# Patient Record
Sex: MALE | Race: WHITE
[De-identification: names, ages, dates, MRNs, and addresses within clinical notes are randomized per-mention and may not be internally consistent; named-entity substitution may affect disease eponyms.]

---

## 2018-04-30 ENCOUNTER — HOSPITAL ENCOUNTER (INPATIENT)
Dept: HOSPITAL 84 - D.ER | Age: 78
LOS: 18 days | Discharge: NURSING, REHAB OR LONG-TERM CARE | DRG: 291 | End: 2018-05-18
Attending: FAMILY MEDICINE | Admitting: FAMILY MEDICINE
Payer: MEDICARE

## 2018-04-30 VITALS
WEIGHT: 271.82 LBS | BODY MASS INDEX: 43.69 KG/M2 | BODY MASS INDEX: 43.69 KG/M2 | HEIGHT: 66 IN | BODY MASS INDEX: 43.69 KG/M2 | HEIGHT: 66 IN | BODY MASS INDEX: 43.69 KG/M2 | WEIGHT: 271.82 LBS

## 2018-04-30 VITALS — DIASTOLIC BLOOD PRESSURE: 79 MMHG | SYSTOLIC BLOOD PRESSURE: 160 MMHG

## 2018-04-30 DIAGNOSIS — I13.0: ICD-10-CM

## 2018-04-30 DIAGNOSIS — I25.10: ICD-10-CM

## 2018-04-30 DIAGNOSIS — E11.65: ICD-10-CM

## 2018-04-30 DIAGNOSIS — J44.0: ICD-10-CM

## 2018-04-30 DIAGNOSIS — N40.0: ICD-10-CM

## 2018-04-30 DIAGNOSIS — J69.0: ICD-10-CM

## 2018-04-30 DIAGNOSIS — E11.40: ICD-10-CM

## 2018-04-30 DIAGNOSIS — N18.6: ICD-10-CM

## 2018-04-30 DIAGNOSIS — F32.9: ICD-10-CM

## 2018-04-30 DIAGNOSIS — Z87.891: ICD-10-CM

## 2018-04-30 DIAGNOSIS — J18.9: ICD-10-CM

## 2018-04-30 DIAGNOSIS — I50.23: ICD-10-CM

## 2018-04-30 DIAGNOSIS — I95.9: ICD-10-CM

## 2018-04-30 DIAGNOSIS — J93.0: ICD-10-CM

## 2018-04-30 DIAGNOSIS — F41.9: ICD-10-CM

## 2018-04-30 DIAGNOSIS — E78.5: ICD-10-CM

## 2018-04-30 DIAGNOSIS — N18.9: ICD-10-CM

## 2018-04-30 DIAGNOSIS — J44.1: ICD-10-CM

## 2018-04-30 DIAGNOSIS — J96.21: ICD-10-CM

## 2018-04-30 DIAGNOSIS — J96.22: ICD-10-CM

## 2018-04-30 DIAGNOSIS — N17.9: ICD-10-CM

## 2018-04-30 DIAGNOSIS — J98.11: ICD-10-CM

## 2018-04-30 DIAGNOSIS — J81.1: ICD-10-CM

## 2018-04-30 DIAGNOSIS — I11.0: Primary | ICD-10-CM

## 2018-04-30 LAB
ALBUMIN SERPL-MCNC: 3.4 G/DL (ref 3.4–5)
ALP SERPL-CCNC: 73 U/L (ref 46–116)
ALT SERPL-CCNC: 19 U/L (ref 10–68)
ANION GAP SERPL CALC-SCNC: 12 MMOL/L (ref 8–16)
APTT BLD: 28.5 SECONDS (ref 22.8–39.4)
BASOPHILS NFR BLD AUTO: 0.2 % (ref 0–2)
BILIRUB SERPL-MCNC: 0.3 MG/DL (ref 0.2–1.3)
BUN SERPL-MCNC: 28 MG/DL (ref 7–18)
CALCIUM SERPL-MCNC: 8.9 MG/DL (ref 8.5–10.1)
CHLORIDE SERPL-SCNC: 103 MMOL/L (ref 98–107)
CK MB SERPL-MCNC: 1.2 U/L (ref 0–3.6)
CK SERPL-CCNC: 30 UL (ref 21–232)
CO2 SERPL-SCNC: 33.8 MMOL/L (ref 21–32)
CREAT SERPL-MCNC: 1.4 MG/DL (ref 0.6–1.3)
D DIMER PPP FEU-MCNC: 0.92 UG/MLFEU (ref 0.2–0.54)
EOSINOPHIL NFR BLD: 4 % (ref 0–7)
ERYTHROCYTE [DISTWIDTH] IN BLOOD BY AUTOMATED COUNT: 13 % (ref 11.5–14.5)
GLOBULIN SER-MCNC: 2.9 G/L
GLUCOSE SERPL-MCNC: 149 MG/DL (ref 74–106)
HCT VFR BLD CALC: 45.1 % (ref 42–54)
HGB BLD-MCNC: 14.4 G/DL (ref 13.5–17.5)
IMM GRANULOCYTES NFR BLD: 0.2 % (ref 0–5)
INR PPP: 1.02 (ref 0.85–1.17)
LYMPHOCYTES NFR BLD AUTO: 31.1 % (ref 15–50)
MCH RBC QN AUTO: 32.6 PG (ref 26–34)
MCHC RBC AUTO-ENTMCNC: 31.9 G/DL (ref 31–37)
MCV RBC: 102 FL (ref 80–100)
MONOCYTES NFR BLD: 6.9 % (ref 2–11)
NEUTROPHILS NFR BLD AUTO: 57.6 % (ref 40–80)
NT-PROBNP SERPL-MCNC: 517 PG/ML (ref 0–450)
OSMOLALITY SERPL CALC.SUM OF ELEC: 297 MOSM/KG (ref 275–300)
PLATELET # BLD: 182 10X3/UL (ref 130–400)
PMV BLD AUTO: 11.1 FL (ref 7.4–10.4)
POTASSIUM SERPL-SCNC: 3.8 MMOL/L (ref 3.5–5.1)
PROT SERPL-MCNC: 6.3 G/DL (ref 6.4–8.2)
PROTHROMBIN TIME: 13 SECONDS (ref 11.6–15)
RBC # BLD AUTO: 4.42 10X6/UL (ref 4.2–6.1)
SODIUM SERPL-SCNC: 145 MMOL/L (ref 136–145)
TROPONIN I SERPL-MCNC: < 0.017 NG/ML (ref 0–0.06)
WBC # BLD AUTO: 8.3 10X3/UL (ref 4.8–10.8)

## 2018-05-01 VITALS — SYSTOLIC BLOOD PRESSURE: 152 MMHG | DIASTOLIC BLOOD PRESSURE: 84 MMHG

## 2018-05-01 VITALS — DIASTOLIC BLOOD PRESSURE: 80 MMHG | SYSTOLIC BLOOD PRESSURE: 170 MMHG

## 2018-05-01 VITALS — DIASTOLIC BLOOD PRESSURE: 80 MMHG | SYSTOLIC BLOOD PRESSURE: 163 MMHG

## 2018-05-01 VITALS — SYSTOLIC BLOOD PRESSURE: 165 MMHG | DIASTOLIC BLOOD PRESSURE: 85 MMHG

## 2018-05-01 VITALS — DIASTOLIC BLOOD PRESSURE: 73 MMHG | SYSTOLIC BLOOD PRESSURE: 154 MMHG

## 2018-05-01 VITALS — SYSTOLIC BLOOD PRESSURE: 100 MMHG | DIASTOLIC BLOOD PRESSURE: 73 MMHG

## 2018-05-01 VITALS — DIASTOLIC BLOOD PRESSURE: 81 MMHG | SYSTOLIC BLOOD PRESSURE: 145 MMHG

## 2018-05-01 LAB
ALBUMIN SERPL-MCNC: 3.3 G/DL (ref 3.4–5)
ALP SERPL-CCNC: 61 U/L (ref 46–116)
ALT SERPL-CCNC: 19 U/L (ref 10–68)
ANION GAP SERPL CALC-SCNC: 13.9 MMOL/L (ref 8–16)
BASOPHILS NFR BLD AUTO: 0 % (ref 0–2)
BILIRUB SERPL-MCNC: 0.32 MG/DL (ref 0.2–1.3)
BUN SERPL-MCNC: 28 MG/DL (ref 7–18)
CALCIUM SERPL-MCNC: 9.2 MG/DL (ref 8.5–10.1)
CHLORIDE SERPL-SCNC: 100 MMOL/L (ref 98–107)
CO2 SERPL-SCNC: 31.1 MMOL/L (ref 21–32)
CREAT SERPL-MCNC: 1.3 MG/DL (ref 0.6–1.3)
EOSINOPHIL NFR BLD: 0 % (ref 0–7)
ERYTHROCYTE [DISTWIDTH] IN BLOOD BY AUTOMATED COUNT: 12.7 % (ref 11.5–14.5)
GLOBULIN SER-MCNC: 3.5 G/L
GLUCOSE SERPL-MCNC: 235 MG/DL (ref 74–106)
HCT VFR BLD CALC: 45.8 % (ref 42–54)
HGB BLD-MCNC: 14.8 G/DL (ref 13.5–17.5)
IMM GRANULOCYTES NFR BLD: 0.2 % (ref 0–5)
LYMPHOCYTES NFR BLD AUTO: 6.8 % (ref 15–50)
MCH RBC QN AUTO: 32.5 PG (ref 26–34)
MCHC RBC AUTO-ENTMCNC: 32.3 G/DL (ref 31–37)
MCV RBC: 100.4 FL (ref 80–100)
MONOCYTES NFR BLD: 2.3 % (ref 2–11)
NEUTROPHILS NFR BLD AUTO: 90.7 % (ref 40–80)
OSMOLALITY SERPL CALC.SUM OF ELEC: 294 MOSM/KG (ref 275–300)
PLATELET # BLD: 216 10X3/UL (ref 130–400)
PMV BLD AUTO: 11.1 FL (ref 7.4–10.4)
POTASSIUM SERPL-SCNC: 4 MMOL/L (ref 3.5–5.1)
PROT SERPL-MCNC: 6.8 G/DL (ref 6.4–8.2)
RBC # BLD AUTO: 4.56 10X6/UL (ref 4.2–6.1)
SODIUM SERPL-SCNC: 141 MMOL/L (ref 136–145)
WBC # BLD AUTO: 8.3 10X3/UL (ref 4.8–10.8)

## 2018-05-02 VITALS — DIASTOLIC BLOOD PRESSURE: 71 MMHG | SYSTOLIC BLOOD PRESSURE: 161 MMHG

## 2018-05-02 VITALS — DIASTOLIC BLOOD PRESSURE: 82 MMHG | SYSTOLIC BLOOD PRESSURE: 159 MMHG

## 2018-05-02 VITALS — SYSTOLIC BLOOD PRESSURE: 138 MMHG | DIASTOLIC BLOOD PRESSURE: 73 MMHG

## 2018-05-02 VITALS — SYSTOLIC BLOOD PRESSURE: 136 MMHG | DIASTOLIC BLOOD PRESSURE: 63 MMHG

## 2018-05-02 VITALS — SYSTOLIC BLOOD PRESSURE: 166 MMHG | DIASTOLIC BLOOD PRESSURE: 88 MMHG

## 2018-05-02 LAB
ALBUMIN SERPL-MCNC: 3.2 G/DL (ref 3.4–5)
ALP SERPL-CCNC: 64 U/L (ref 46–116)
ALT SERPL-CCNC: 18 U/L (ref 10–68)
ANION GAP SERPL CALC-SCNC: 9.3 MMOL/L (ref 8–16)
BASOPHILS NFR BLD AUTO: 0 % (ref 0–2)
BILIRUB SERPL-MCNC: 0.3 MG/DL (ref 0.2–1.3)
BUN SERPL-MCNC: 27 MG/DL (ref 7–18)
CALCIUM SERPL-MCNC: 9.1 MG/DL (ref 8.5–10.1)
CHLORIDE SERPL-SCNC: 104 MMOL/L (ref 98–107)
CO2 SERPL-SCNC: 33.6 MMOL/L (ref 21–32)
CREAT SERPL-MCNC: 1.1 MG/DL (ref 0.6–1.3)
EOSINOPHIL NFR BLD: 0 % (ref 0–7)
ERYTHROCYTE [DISTWIDTH] IN BLOOD BY AUTOMATED COUNT: 12.7 % (ref 11.5–14.5)
GLOBULIN SER-MCNC: 3.2 G/L
GLUCOSE SERPL-MCNC: 210 MG/DL (ref 74–106)
HCT VFR BLD CALC: 45.4 % (ref 42–54)
HGB BLD-MCNC: 14.7 G/DL (ref 13.5–17.5)
IMM GRANULOCYTES NFR BLD: 0.2 % (ref 0–5)
LYMPHOCYTES NFR BLD AUTO: 7.7 % (ref 15–50)
MCH RBC QN AUTO: 32.2 PG (ref 26–34)
MCHC RBC AUTO-ENTMCNC: 32.4 G/DL (ref 31–37)
MCV RBC: 99.3 FL (ref 80–100)
MONOCYTES NFR BLD: 4.2 % (ref 2–11)
NEUTROPHILS NFR BLD AUTO: 87.9 % (ref 40–80)
OSMOLALITY SERPL CALC.SUM OF ELEC: 295 MOSM/KG (ref 275–300)
PLATELET # BLD: 229 10X3/UL (ref 130–400)
PMV BLD AUTO: 11.5 FL (ref 7.4–10.4)
POTASSIUM SERPL-SCNC: 3.9 MMOL/L (ref 3.5–5.1)
PROT SERPL-MCNC: 6.4 G/DL (ref 6.4–8.2)
RBC # BLD AUTO: 4.57 10X6/UL (ref 4.2–6.1)
SODIUM SERPL-SCNC: 143 MMOL/L (ref 136–145)
WBC # BLD AUTO: 9.8 10X3/UL (ref 4.8–10.8)

## 2018-05-03 VITALS — SYSTOLIC BLOOD PRESSURE: 130 MMHG | DIASTOLIC BLOOD PRESSURE: 70 MMHG

## 2018-05-03 VITALS — DIASTOLIC BLOOD PRESSURE: 83 MMHG | SYSTOLIC BLOOD PRESSURE: 179 MMHG

## 2018-05-03 VITALS — DIASTOLIC BLOOD PRESSURE: 68 MMHG | SYSTOLIC BLOOD PRESSURE: 132 MMHG

## 2018-05-03 VITALS — SYSTOLIC BLOOD PRESSURE: 144 MMHG | DIASTOLIC BLOOD PRESSURE: 75 MMHG

## 2018-05-03 VITALS — DIASTOLIC BLOOD PRESSURE: 71 MMHG | SYSTOLIC BLOOD PRESSURE: 126 MMHG

## 2018-05-03 LAB
ALBUMIN SERPL-MCNC: 3 G/DL (ref 3.4–5)
ALP SERPL-CCNC: 59 U/L (ref 46–116)
ALT SERPL-CCNC: 20 U/L (ref 10–68)
ANION GAP SERPL CALC-SCNC: 6.9 MMOL/L (ref 8–16)
BASOPHILS NFR BLD AUTO: 0 % (ref 0–2)
BILIRUB SERPL-MCNC: 0.2 MG/DL (ref 0.2–1.3)
BUN SERPL-MCNC: 36 MG/DL (ref 7–18)
CALCIUM SERPL-MCNC: 9.1 MG/DL (ref 8.5–10.1)
CHLORIDE SERPL-SCNC: 104 MMOL/L (ref 98–107)
CO2 SERPL-SCNC: 36.3 MMOL/L (ref 21–32)
CREAT SERPL-MCNC: 1.2 MG/DL (ref 0.6–1.3)
EOSINOPHIL NFR BLD: 0 % (ref 0–7)
ERYTHROCYTE [DISTWIDTH] IN BLOOD BY AUTOMATED COUNT: 12.9 % (ref 11.5–14.5)
GLOBULIN SER-MCNC: 3.1 G/L
GLUCOSE SERPL-MCNC: 223 MG/DL (ref 74–106)
HCT VFR BLD CALC: 45 % (ref 42–54)
HGB BLD-MCNC: 14.5 G/DL (ref 13.5–17.5)
IMM GRANULOCYTES NFR BLD: 0.2 % (ref 0–5)
LYMPHOCYTES NFR BLD AUTO: 7.1 % (ref 15–50)
MCH RBC QN AUTO: 32.2 PG (ref 26–34)
MCHC RBC AUTO-ENTMCNC: 32.2 G/DL (ref 31–37)
MCV RBC: 100 FL (ref 80–100)
MONOCYTES NFR BLD: 5.3 % (ref 2–11)
NEUTROPHILS NFR BLD AUTO: 87.4 % (ref 40–80)
OSMOLALITY SERPL CALC.SUM OF ELEC: 299 MOSM/KG (ref 275–300)
PLATELET # BLD: 210 10X3/UL (ref 130–400)
PMV BLD AUTO: 11.4 FL (ref 7.4–10.4)
POTASSIUM SERPL-SCNC: 4.2 MMOL/L (ref 3.5–5.1)
PROT SERPL-MCNC: 6.1 G/DL (ref 6.4–8.2)
RBC # BLD AUTO: 4.5 10X6/UL (ref 4.2–6.1)
SODIUM SERPL-SCNC: 143 MMOL/L (ref 136–145)
WBC # BLD AUTO: 9.6 10X3/UL (ref 4.8–10.8)

## 2018-05-04 VITALS — SYSTOLIC BLOOD PRESSURE: 119 MMHG | DIASTOLIC BLOOD PRESSURE: 76 MMHG

## 2018-05-04 VITALS — SYSTOLIC BLOOD PRESSURE: 117 MMHG | DIASTOLIC BLOOD PRESSURE: 56 MMHG

## 2018-05-04 VITALS — DIASTOLIC BLOOD PRESSURE: 70 MMHG | SYSTOLIC BLOOD PRESSURE: 135 MMHG

## 2018-05-04 VITALS — DIASTOLIC BLOOD PRESSURE: 61 MMHG | SYSTOLIC BLOOD PRESSURE: 125 MMHG

## 2018-05-04 VITALS — SYSTOLIC BLOOD PRESSURE: 130 MMHG | DIASTOLIC BLOOD PRESSURE: 70 MMHG

## 2018-05-04 VITALS — DIASTOLIC BLOOD PRESSURE: 66 MMHG | SYSTOLIC BLOOD PRESSURE: 111 MMHG

## 2018-05-04 LAB
ALBUMIN SERPL-MCNC: 2.7 G/DL (ref 3.4–5)
ALP SERPL-CCNC: 56 U/L (ref 46–116)
ALT SERPL-CCNC: 22 U/L (ref 10–68)
ANION GAP SERPL CALC-SCNC: 8.3 MMOL/L (ref 8–16)
BASOPHILS NFR BLD AUTO: 0 % (ref 0–2)
BILIRUB SERPL-MCNC: 0.28 MG/DL (ref 0.2–1.3)
BUN SERPL-MCNC: 40 MG/DL (ref 7–18)
CALCIUM SERPL-MCNC: 8.8 MG/DL (ref 8.5–10.1)
CHLORIDE SERPL-SCNC: 103 MMOL/L (ref 98–107)
CO2 SERPL-SCNC: 34.9 MMOL/L (ref 21–32)
CREAT SERPL-MCNC: 1.3 MG/DL (ref 0.6–1.3)
EOSINOPHIL NFR BLD: 0 % (ref 0–7)
ERYTHROCYTE [DISTWIDTH] IN BLOOD BY AUTOMATED COUNT: 12.9 % (ref 11.5–14.5)
GLOBULIN SER-MCNC: 3 G/L
GLUCOSE SERPL-MCNC: 210 MG/DL (ref 74–106)
HCT VFR BLD CALC: 45.3 % (ref 42–54)
HGB BLD-MCNC: 14.5 G/DL (ref 13.5–17.5)
IMM GRANULOCYTES NFR BLD: 0.2 % (ref 0–5)
LYMPHOCYTES NFR BLD AUTO: 10.6 % (ref 15–50)
MCH RBC QN AUTO: 32.4 PG (ref 26–34)
MCHC RBC AUTO-ENTMCNC: 32 G/DL (ref 31–37)
MCV RBC: 101.1 FL (ref 80–100)
MONOCYTES NFR BLD: 5.3 % (ref 2–11)
NEUTROPHILS NFR BLD AUTO: 83.9 % (ref 40–80)
OSMOLALITY SERPL CALC.SUM OF ELEC: 298 MOSM/KG (ref 275–300)
PLATELET # BLD: 205 10X3/UL (ref 130–400)
PMV BLD AUTO: 11.5 FL (ref 7.4–10.4)
POTASSIUM SERPL-SCNC: 4.2 MMOL/L (ref 3.5–5.1)
PROT SERPL-MCNC: 5.7 G/DL (ref 6.4–8.2)
RBC # BLD AUTO: 4.48 10X6/UL (ref 4.2–6.1)
SODIUM SERPL-SCNC: 142 MMOL/L (ref 136–145)
WBC # BLD AUTO: 9.6 10X3/UL (ref 4.8–10.8)

## 2018-05-05 VITALS — SYSTOLIC BLOOD PRESSURE: 142 MMHG | DIASTOLIC BLOOD PRESSURE: 78 MMHG

## 2018-05-05 VITALS — DIASTOLIC BLOOD PRESSURE: 81 MMHG | SYSTOLIC BLOOD PRESSURE: 120 MMHG

## 2018-05-05 VITALS — DIASTOLIC BLOOD PRESSURE: 76 MMHG | SYSTOLIC BLOOD PRESSURE: 138 MMHG

## 2018-05-05 VITALS — SYSTOLIC BLOOD PRESSURE: 136 MMHG | DIASTOLIC BLOOD PRESSURE: 76 MMHG

## 2018-05-05 VITALS — SYSTOLIC BLOOD PRESSURE: 145 MMHG | DIASTOLIC BLOOD PRESSURE: 65 MMHG

## 2018-05-05 LAB
ALBUMIN SERPL-MCNC: 2.8 G/DL (ref 3.4–5)
ALP SERPL-CCNC: 64 U/L (ref 46–116)
ALT SERPL-CCNC: 24 U/L (ref 10–68)
ANION GAP SERPL CALC-SCNC: 7.1 MMOL/L (ref 8–16)
BASOPHILS NFR BLD AUTO: 0 % (ref 0–2)
BILIRUB SERPL-MCNC: 0.3 MG/DL (ref 0.2–1.3)
BUN SERPL-MCNC: 49 MG/DL (ref 7–18)
CALCIUM SERPL-MCNC: 8.5 MG/DL (ref 8.5–10.1)
CHLORIDE SERPL-SCNC: 103 MMOL/L (ref 98–107)
CO2 SERPL-SCNC: 36.1 MMOL/L (ref 21–32)
CREAT SERPL-MCNC: 1.4 MG/DL (ref 0.6–1.3)
EOSINOPHIL NFR BLD: 0 % (ref 0–7)
ERYTHROCYTE [DISTWIDTH] IN BLOOD BY AUTOMATED COUNT: 12.7 % (ref 11.5–14.5)
GLOBULIN SER-MCNC: 3 G/L
GLUCOSE SERPL-MCNC: 222 MG/DL (ref 74–106)
HCT VFR BLD CALC: 45.9 % (ref 42–54)
HGB BLD-MCNC: 14.9 G/DL (ref 13.5–17.5)
IMM GRANULOCYTES NFR BLD: 0.3 % (ref 0–5)
LYMPHOCYTES NFR BLD AUTO: 11.6 % (ref 15–50)
MCH RBC QN AUTO: 32.5 PG (ref 26–34)
MCHC RBC AUTO-ENTMCNC: 32.5 G/DL (ref 31–37)
MCV RBC: 100.2 FL (ref 80–100)
MONOCYTES NFR BLD: 7 % (ref 2–11)
NEUTROPHILS NFR BLD AUTO: 81.1 % (ref 40–80)
OSMOLALITY SERPL CALC.SUM OF ELEC: 302 MOSM/KG (ref 275–300)
PLATELET # BLD: 203 10X3/UL (ref 130–400)
PMV BLD AUTO: 11.2 FL (ref 7.4–10.4)
POTASSIUM SERPL-SCNC: 4.2 MMOL/L (ref 3.5–5.1)
PROT SERPL-MCNC: 5.8 G/DL (ref 6.4–8.2)
RBC # BLD AUTO: 4.58 10X6/UL (ref 4.2–6.1)
SODIUM SERPL-SCNC: 142 MMOL/L (ref 136–145)
WBC # BLD AUTO: 9.7 10X3/UL (ref 4.8–10.8)

## 2018-05-06 VITALS — DIASTOLIC BLOOD PRESSURE: 72 MMHG | SYSTOLIC BLOOD PRESSURE: 146 MMHG

## 2018-05-06 VITALS — SYSTOLIC BLOOD PRESSURE: 137 MMHG | DIASTOLIC BLOOD PRESSURE: 80 MMHG

## 2018-05-06 VITALS — SYSTOLIC BLOOD PRESSURE: 126 MMHG | DIASTOLIC BLOOD PRESSURE: 75 MMHG

## 2018-05-06 VITALS — SYSTOLIC BLOOD PRESSURE: 104 MMHG | DIASTOLIC BLOOD PRESSURE: 67 MMHG

## 2018-05-06 VITALS — DIASTOLIC BLOOD PRESSURE: 71 MMHG | SYSTOLIC BLOOD PRESSURE: 147 MMHG

## 2018-05-06 VITALS — DIASTOLIC BLOOD PRESSURE: 65 MMHG | SYSTOLIC BLOOD PRESSURE: 122 MMHG

## 2018-05-06 LAB
ALBUMIN SERPL-MCNC: 2.6 G/DL (ref 3.4–5)
ALP SERPL-CCNC: 61 U/L (ref 46–116)
ALT SERPL-CCNC: 24 U/L (ref 10–68)
ANION GAP SERPL CALC-SCNC: 5.4 MMOL/L (ref 8–16)
BASOPHILS NFR BLD AUTO: 0 % (ref 0–2)
BILIRUB SERPL-MCNC: 0.3 MG/DL (ref 0.2–1.3)
BUN SERPL-MCNC: 46 MG/DL (ref 7–18)
CALCIUM SERPL-MCNC: 8.4 MG/DL (ref 8.5–10.1)
CHLORIDE SERPL-SCNC: 103 MMOL/L (ref 98–107)
CO2 SERPL-SCNC: 36.9 MMOL/L (ref 21–32)
CREAT SERPL-MCNC: 1.3 MG/DL (ref 0.6–1.3)
EOSINOPHIL NFR BLD: 0 % (ref 0–7)
ERYTHROCYTE [DISTWIDTH] IN BLOOD BY AUTOMATED COUNT: 12.6 % (ref 11.5–14.5)
GLOBULIN SER-MCNC: 2.9 G/L
GLUCOSE SERPL-MCNC: 214 MG/DL (ref 74–106)
HCT VFR BLD CALC: 45.7 % (ref 42–54)
HGB BLD-MCNC: 14.5 G/DL (ref 13.5–17.5)
IMM GRANULOCYTES NFR BLD: 0.2 % (ref 0–5)
LYMPHOCYTES NFR BLD AUTO: 10.4 % (ref 15–50)
MCH RBC QN AUTO: 31.7 PG (ref 26–34)
MCHC RBC AUTO-ENTMCNC: 31.7 G/DL (ref 31–37)
MCV RBC: 99.8 FL (ref 80–100)
MONOCYTES NFR BLD: 5.5 % (ref 2–11)
NEUTROPHILS NFR BLD AUTO: 83.9 % (ref 40–80)
OSMOLALITY SERPL CALC.SUM OF ELEC: 298 MOSM/KG (ref 275–300)
PLATELET # BLD: 217 10X3/UL (ref 130–400)
PMV BLD AUTO: 11.5 FL (ref 7.4–10.4)
POTASSIUM SERPL-SCNC: 4.3 MMOL/L (ref 3.5–5.1)
PROT SERPL-MCNC: 5.5 G/DL (ref 6.4–8.2)
RBC # BLD AUTO: 4.58 10X6/UL (ref 4.2–6.1)
SODIUM SERPL-SCNC: 141 MMOL/L (ref 136–145)
WBC # BLD AUTO: 9.9 10X3/UL (ref 4.8–10.8)

## 2018-05-07 VITALS — SYSTOLIC BLOOD PRESSURE: 88 MMHG | DIASTOLIC BLOOD PRESSURE: 48 MMHG

## 2018-05-07 VITALS — SYSTOLIC BLOOD PRESSURE: 150 MMHG | DIASTOLIC BLOOD PRESSURE: 78 MMHG

## 2018-05-07 VITALS — SYSTOLIC BLOOD PRESSURE: 132 MMHG | DIASTOLIC BLOOD PRESSURE: 84 MMHG

## 2018-05-07 VITALS — DIASTOLIC BLOOD PRESSURE: 52 MMHG | SYSTOLIC BLOOD PRESSURE: 89 MMHG

## 2018-05-07 VITALS — DIASTOLIC BLOOD PRESSURE: 51 MMHG | SYSTOLIC BLOOD PRESSURE: 90 MMHG

## 2018-05-07 LAB
ALBUMIN SERPL-MCNC: 2.5 G/DL (ref 3.4–5)
ALP SERPL-CCNC: 66 U/L (ref 46–116)
ALT SERPL-CCNC: 24 U/L (ref 10–68)
ANION GAP SERPL CALC-SCNC: 7.9 MMOL/L (ref 8–16)
BASOPHILS NFR BLD AUTO: 0 % (ref 0–2)
BILIRUB SERPL-MCNC: 0.4 MG/DL (ref 0.2–1.3)
BUN SERPL-MCNC: 51 MG/DL (ref 7–18)
CALCIUM SERPL-MCNC: 8.7 MG/DL (ref 8.5–10.1)
CHLORIDE SERPL-SCNC: 102 MMOL/L (ref 98–107)
CO2 SERPL-SCNC: 35.3 MMOL/L (ref 21–32)
CREAT SERPL-MCNC: 1.3 MG/DL (ref 0.6–1.3)
CRP SERPL-MCNC: < 0.2 MG/DL (ref 0–0.9)
EOSINOPHIL NFR BLD: 0 % (ref 0–7)
ERYTHROCYTE [DISTWIDTH] IN BLOOD BY AUTOMATED COUNT: 12.7 % (ref 11.5–14.5)
GLOBULIN SER-MCNC: 2.9 G/L
GLUCOSE SERPL-MCNC: 231 MG/DL (ref 74–106)
HCT VFR BLD CALC: 44.9 % (ref 42–54)
HGB BLD-MCNC: 14.5 G/DL (ref 13.5–17.5)
IMM GRANULOCYTES NFR BLD: 0.3 % (ref 0–5)
INR PPP: 1.12 (ref 0.85–1.17)
LYMPHOCYTES NFR BLD AUTO: 8.8 % (ref 15–50)
MCH RBC QN AUTO: 32.1 PG (ref 26–34)
MCHC RBC AUTO-ENTMCNC: 32.3 G/DL (ref 31–37)
MCV RBC: 99.3 FL (ref 80–100)
MONOCYTES NFR BLD: 5.7 % (ref 2–11)
NEUTROPHILS NFR BLD AUTO: 85.2 % (ref 40–80)
OSMOLALITY SERPL CALC.SUM OF ELEC: 301 MOSM/KG (ref 275–300)
PLATELET # BLD: 188 10X3/UL (ref 130–400)
PMV BLD AUTO: 11.8 FL (ref 7.4–10.4)
POTASSIUM SERPL-SCNC: 4.2 MMOL/L (ref 3.5–5.1)
PROT SERPL-MCNC: 5.4 G/DL (ref 6.4–8.2)
PROTHROMBIN TIME: 14 SECONDS (ref 11.6–15)
RBC # BLD AUTO: 4.52 10X6/UL (ref 4.2–6.1)
SODIUM SERPL-SCNC: 141 MMOL/L (ref 136–145)
WBC # BLD AUTO: 10.5 10X3/UL (ref 4.8–10.8)

## 2018-05-08 VITALS — SYSTOLIC BLOOD PRESSURE: 89 MMHG | DIASTOLIC BLOOD PRESSURE: 51 MMHG

## 2018-05-08 VITALS — SYSTOLIC BLOOD PRESSURE: 93 MMHG | DIASTOLIC BLOOD PRESSURE: 58 MMHG

## 2018-05-08 VITALS — DIASTOLIC BLOOD PRESSURE: 44 MMHG | SYSTOLIC BLOOD PRESSURE: 80 MMHG

## 2018-05-08 VITALS — DIASTOLIC BLOOD PRESSURE: 58 MMHG | SYSTOLIC BLOOD PRESSURE: 96 MMHG

## 2018-05-08 VITALS — DIASTOLIC BLOOD PRESSURE: 59 MMHG | SYSTOLIC BLOOD PRESSURE: 101 MMHG

## 2018-05-08 VITALS — SYSTOLIC BLOOD PRESSURE: 96 MMHG | DIASTOLIC BLOOD PRESSURE: 58 MMHG

## 2018-05-08 LAB
ANION GAP SERPL CALC-SCNC: 8.3 MMOL/L (ref 8–16)
BUN SERPL-MCNC: 66 MG/DL (ref 7–18)
CALCIUM SERPL-MCNC: 8.6 MG/DL (ref 8.5–10.1)
CHLORIDE SERPL-SCNC: 100 MMOL/L (ref 98–107)
CO2 SERPL-SCNC: 33.9 MMOL/L (ref 21–32)
CREAT SERPL-MCNC: 1.7 MG/DL (ref 0.6–1.3)
ERYTHROCYTE [DISTWIDTH] IN BLOOD BY AUTOMATED COUNT: 12.5 % (ref 11.5–14.5)
ERYTHROCYTE [DISTWIDTH] IN BLOOD BY AUTOMATED COUNT: 12.8 % (ref 11.5–14.5)
GLUCOSE SERPL-MCNC: 242 MG/DL (ref 74–106)
HCT VFR BLD CALC: 33.5 % (ref 42–54)
HCT VFR BLD CALC: 42 % (ref 42–54)
HGB BLD-MCNC: 11 G/DL (ref 13.5–17.5)
HGB BLD-MCNC: 13.5 G/DL (ref 13.5–17.5)
LYMPHOCYTES NFR BLD AUTO: 6.2 % (ref 15–50)
LYMPHOCYTES NFR BLD AUTO: 9 % (ref 15–50)
MCH RBC QN AUTO: 31.7 PG (ref 26–34)
MCH RBC QN AUTO: 32.1 PG (ref 26–34)
MCHC RBC AUTO-ENTMCNC: 32.1 G/DL (ref 31–37)
MCHC RBC AUTO-ENTMCNC: 32.8 G/DL (ref 31–37)
MCV RBC: 96.5 FL (ref 80–100)
MCV RBC: 99.8 FL (ref 80–100)
NEUTROPHILS NFR BLD AUTO: 87.3 % (ref 40–80)
NEUTROPHILS NFR BLD AUTO: 91 % (ref 40–80)
OSMOLALITY SERPL CALC.SUM OF ELEC: 302 MOSM/KG (ref 275–300)
PLATELET # BLD EST: NORMAL 10*3/UL
PLATELET # BLD: 199 10X3/UL (ref 130–400)
PLATELET # BLD: 219 10X3/UL (ref 130–400)
PMV BLD AUTO: 10.6 FL (ref 7.4–10.4)
PMV BLD AUTO: 11.6 FL (ref 7.4–10.4)
POTASSIUM SERPL-SCNC: 4.2 MMOL/L (ref 3.5–5.1)
RBC # BLD AUTO: 3.47 10X6/UL (ref 4.2–6.1)
RBC # BLD AUTO: 4.21 10X6/UL (ref 4.2–6.1)
SODIUM SERPL-SCNC: 138 MMOL/L (ref 136–145)
WBC # BLD AUTO: 13.3 10X3/UL (ref 4.8–10.8)
WBC # BLD AUTO: 18.3 10X3/UL (ref 4.8–10.8)

## 2018-05-09 VITALS — DIASTOLIC BLOOD PRESSURE: 57 MMHG | SYSTOLIC BLOOD PRESSURE: 92 MMHG

## 2018-05-09 VITALS — DIASTOLIC BLOOD PRESSURE: 38 MMHG | SYSTOLIC BLOOD PRESSURE: 99 MMHG

## 2018-05-09 VITALS — DIASTOLIC BLOOD PRESSURE: 57 MMHG | SYSTOLIC BLOOD PRESSURE: 84 MMHG

## 2018-05-09 VITALS — DIASTOLIC BLOOD PRESSURE: 50 MMHG | SYSTOLIC BLOOD PRESSURE: 86 MMHG

## 2018-05-09 VITALS — DIASTOLIC BLOOD PRESSURE: 52 MMHG | SYSTOLIC BLOOD PRESSURE: 91 MMHG

## 2018-05-09 VITALS — DIASTOLIC BLOOD PRESSURE: 54 MMHG | SYSTOLIC BLOOD PRESSURE: 100 MMHG

## 2018-05-09 VITALS — DIASTOLIC BLOOD PRESSURE: 41 MMHG | SYSTOLIC BLOOD PRESSURE: 97 MMHG

## 2018-05-09 VITALS — DIASTOLIC BLOOD PRESSURE: 40 MMHG | SYSTOLIC BLOOD PRESSURE: 104 MMHG

## 2018-05-09 VITALS — SYSTOLIC BLOOD PRESSURE: 109 MMHG | DIASTOLIC BLOOD PRESSURE: 59 MMHG

## 2018-05-09 VITALS — DIASTOLIC BLOOD PRESSURE: 39 MMHG | SYSTOLIC BLOOD PRESSURE: 97 MMHG

## 2018-05-09 VITALS — DIASTOLIC BLOOD PRESSURE: 43 MMHG | SYSTOLIC BLOOD PRESSURE: 72 MMHG

## 2018-05-09 VITALS — DIASTOLIC BLOOD PRESSURE: 58 MMHG | SYSTOLIC BLOOD PRESSURE: 92 MMHG

## 2018-05-09 VITALS — SYSTOLIC BLOOD PRESSURE: 105 MMHG | DIASTOLIC BLOOD PRESSURE: 64 MMHG

## 2018-05-09 LAB
ALBUMIN SERPL-MCNC: 2.2 G/DL (ref 3.4–5)
ALP SERPL-CCNC: 44 U/L (ref 46–116)
ALT SERPL-CCNC: 20 U/L (ref 10–68)
ANION GAP SERPL CALC-SCNC: 5.3 MMOL/L (ref 8–16)
ANION GAP SERPL CALC-SCNC: 6.3 MMOL/L (ref 8–16)
ANION GAP SERPL CALC-SCNC: 8.9 MMOL/L (ref 8–16)
BASOPHILS NFR BLD AUTO: 0 % (ref 0–2)
BASOPHILS NFR BLD AUTO: 0.1 % (ref 0–2)
BILIRUB SERPL-MCNC: 0.34 MG/DL (ref 0.2–1.3)
BUN SERPL-MCNC: 104 MG/DL (ref 7–18)
BUN SERPL-MCNC: 79 MG/DL (ref 7–18)
BUN SERPL-MCNC: 92 MG/DL (ref 7–18)
CALCIUM SERPL-MCNC: 8.1 MG/DL (ref 8.5–10.1)
CALCIUM SERPL-MCNC: 8.1 MG/DL (ref 8.5–10.1)
CALCIUM SERPL-MCNC: 8.6 MG/DL (ref 8.5–10.1)
CHLORIDE SERPL-SCNC: 100 MMOL/L (ref 98–107)
CHLORIDE SERPL-SCNC: 102 MMOL/L (ref 98–107)
CHLORIDE SERPL-SCNC: 99 MMOL/L (ref 98–107)
CO2 SERPL-SCNC: 31.9 MMOL/L (ref 21–32)
CO2 SERPL-SCNC: 34 MMOL/L (ref 21–32)
CO2 SERPL-SCNC: 35.2 MMOL/L (ref 21–32)
CREAT SERPL-MCNC: 2.2 MG/DL (ref 0.6–1.3)
CREAT SERPL-MCNC: 2.2 MG/DL (ref 0.6–1.3)
CREAT SERPL-MCNC: 2.8 MG/DL (ref 0.6–1.3)
EOSINOPHIL NFR BLD: 0 % (ref 0–7)
EOSINOPHIL NFR BLD: 0.1 % (ref 0–7)
ERYTHROCYTE [DISTWIDTH] IN BLOOD BY AUTOMATED COUNT: 12.4 % (ref 11.5–14.5)
ERYTHROCYTE [DISTWIDTH] IN BLOOD BY AUTOMATED COUNT: 12.5 % (ref 11.5–14.5)
GLOBULIN SER-MCNC: 2.3 G/L
GLUCOSE SERPL-MCNC: 232 MG/DL (ref 74–106)
GLUCOSE SERPL-MCNC: 242 MG/DL (ref 74–106)
GLUCOSE SERPL-MCNC: 262 MG/DL (ref 74–106)
HCT VFR BLD CALC: 29.1 % (ref 42–54)
HCT VFR BLD CALC: 31.1 % (ref 42–54)
HGB BLD-MCNC: 10.2 G/DL (ref 13.5–17.5)
HGB BLD-MCNC: 9.6 G/DL (ref 13.5–17.5)
IMM GRANULOCYTES NFR BLD: 0.4 % (ref 0–5)
IMM GRANULOCYTES NFR BLD: 0.5 % (ref 0–5)
LYMPHOCYTES NFR BLD AUTO: 11.7 % (ref 15–50)
LYMPHOCYTES NFR BLD AUTO: 7.5 % (ref 15–50)
MCH RBC QN AUTO: 31.6 PG (ref 26–34)
MCH RBC QN AUTO: 31.9 PG (ref 26–34)
MCHC RBC AUTO-ENTMCNC: 32.8 G/DL (ref 31–37)
MCHC RBC AUTO-ENTMCNC: 33 G/DL (ref 31–37)
MCV RBC: 95.7 FL (ref 80–100)
MCV RBC: 97.2 FL (ref 80–100)
MONOCYTES NFR BLD: 10.5 % (ref 2–11)
MONOCYTES NFR BLD: 8.3 % (ref 2–11)
NEUTROPHILS NFR BLD AUTO: 79.4 % (ref 40–80)
NEUTROPHILS NFR BLD AUTO: 81.5 % (ref 40–80)
NT-PROBNP SERPL-MCNC: 276 PG/ML (ref 0–450)
OSMOLALITY SERPL CALC.SUM OF ELEC: 304 MOSM/KG (ref 275–300)
OSMOLALITY SERPL CALC.SUM OF ELEC: 310 MOSM/KG (ref 275–300)
OSMOLALITY SERPL CALC.SUM OF ELEC: 310 MOSM/KG (ref 275–300)
PLATELET # BLD: 218 10X3/UL (ref 130–400)
PLATELET # BLD: 255 10X3/UL (ref 130–400)
PMV BLD AUTO: 11.2 FL (ref 7.4–10.4)
PMV BLD AUTO: 11.5 FL (ref 7.4–10.4)
POTASSIUM SERPL-SCNC: 4.3 MMOL/L (ref 3.5–5.1)
POTASSIUM SERPL-SCNC: 4.5 MMOL/L (ref 3.5–5.1)
POTASSIUM SERPL-SCNC: 4.8 MMOL/L (ref 3.5–5.1)
PROT SERPL-MCNC: 4.5 G/DL (ref 6.4–8.2)
RBC # BLD AUTO: 3.04 10X6/UL (ref 4.2–6.1)
RBC # BLD AUTO: 3.2 10X6/UL (ref 4.2–6.1)
SODIUM SERPL-SCNC: 135 MMOL/L (ref 136–145)
SODIUM SERPL-SCNC: 136 MMOL/L (ref 136–145)
SODIUM SERPL-SCNC: 138 MMOL/L (ref 136–145)
WBC # BLD AUTO: 13.1 10X3/UL (ref 4.8–10.8)
WBC # BLD AUTO: 18.5 10X3/UL (ref 4.8–10.8)

## 2018-05-09 PROCEDURE — 5A09457 ASSISTANCE WITH RESPIRATORY VENTILATION, 24-96 CONSECUTIVE HOURS, CONTINUOUS POSITIVE AIRWAY PRESSURE: ICD-10-PCS | Performed by: INTERNAL MEDICINE

## 2018-05-10 VITALS — DIASTOLIC BLOOD PRESSURE: 81 MMHG | SYSTOLIC BLOOD PRESSURE: 142 MMHG

## 2018-05-10 VITALS — DIASTOLIC BLOOD PRESSURE: 51 MMHG | SYSTOLIC BLOOD PRESSURE: 81 MMHG

## 2018-05-10 VITALS — SYSTOLIC BLOOD PRESSURE: 97 MMHG | DIASTOLIC BLOOD PRESSURE: 44 MMHG

## 2018-05-10 VITALS — DIASTOLIC BLOOD PRESSURE: 67 MMHG | SYSTOLIC BLOOD PRESSURE: 91 MMHG

## 2018-05-10 VITALS — DIASTOLIC BLOOD PRESSURE: 51 MMHG | SYSTOLIC BLOOD PRESSURE: 88 MMHG

## 2018-05-10 VITALS — DIASTOLIC BLOOD PRESSURE: 56 MMHG | SYSTOLIC BLOOD PRESSURE: 110 MMHG

## 2018-05-10 VITALS — DIASTOLIC BLOOD PRESSURE: 58 MMHG | SYSTOLIC BLOOD PRESSURE: 88 MMHG

## 2018-05-10 VITALS — DIASTOLIC BLOOD PRESSURE: 52 MMHG | SYSTOLIC BLOOD PRESSURE: 118 MMHG

## 2018-05-10 VITALS — DIASTOLIC BLOOD PRESSURE: 44 MMHG | SYSTOLIC BLOOD PRESSURE: 102 MMHG

## 2018-05-10 VITALS — DIASTOLIC BLOOD PRESSURE: 68 MMHG | SYSTOLIC BLOOD PRESSURE: 118 MMHG

## 2018-05-10 VITALS — SYSTOLIC BLOOD PRESSURE: 88 MMHG | DIASTOLIC BLOOD PRESSURE: 49 MMHG

## 2018-05-10 VITALS — SYSTOLIC BLOOD PRESSURE: 111 MMHG | DIASTOLIC BLOOD PRESSURE: 55 MMHG

## 2018-05-10 VITALS — DIASTOLIC BLOOD PRESSURE: 54 MMHG | SYSTOLIC BLOOD PRESSURE: 112 MMHG

## 2018-05-10 VITALS — DIASTOLIC BLOOD PRESSURE: 55 MMHG | SYSTOLIC BLOOD PRESSURE: 88 MMHG

## 2018-05-10 VITALS — SYSTOLIC BLOOD PRESSURE: 83 MMHG | DIASTOLIC BLOOD PRESSURE: 44 MMHG

## 2018-05-10 VITALS — SYSTOLIC BLOOD PRESSURE: 84 MMHG | DIASTOLIC BLOOD PRESSURE: 53 MMHG

## 2018-05-10 VITALS — DIASTOLIC BLOOD PRESSURE: 53 MMHG | SYSTOLIC BLOOD PRESSURE: 109 MMHG

## 2018-05-10 VITALS — SYSTOLIC BLOOD PRESSURE: 97 MMHG | DIASTOLIC BLOOD PRESSURE: 54 MMHG

## 2018-05-10 VITALS — SYSTOLIC BLOOD PRESSURE: 105 MMHG | DIASTOLIC BLOOD PRESSURE: 52 MMHG

## 2018-05-10 VITALS — SYSTOLIC BLOOD PRESSURE: 85 MMHG | DIASTOLIC BLOOD PRESSURE: 47 MMHG

## 2018-05-10 VITALS — SYSTOLIC BLOOD PRESSURE: 103 MMHG | DIASTOLIC BLOOD PRESSURE: 49 MMHG

## 2018-05-10 VITALS — SYSTOLIC BLOOD PRESSURE: 101 MMHG | DIASTOLIC BLOOD PRESSURE: 54 MMHG

## 2018-05-10 VITALS — DIASTOLIC BLOOD PRESSURE: 51 MMHG | SYSTOLIC BLOOD PRESSURE: 84 MMHG

## 2018-05-10 VITALS — DIASTOLIC BLOOD PRESSURE: 87 MMHG | SYSTOLIC BLOOD PRESSURE: 96 MMHG

## 2018-05-10 VITALS — SYSTOLIC BLOOD PRESSURE: 98 MMHG | DIASTOLIC BLOOD PRESSURE: 47 MMHG

## 2018-05-10 VITALS — SYSTOLIC BLOOD PRESSURE: 111 MMHG | DIASTOLIC BLOOD PRESSURE: 49 MMHG

## 2018-05-10 VITALS — SYSTOLIC BLOOD PRESSURE: 118 MMHG | DIASTOLIC BLOOD PRESSURE: 56 MMHG

## 2018-05-10 VITALS — DIASTOLIC BLOOD PRESSURE: 63 MMHG | SYSTOLIC BLOOD PRESSURE: 110 MMHG

## 2018-05-10 VITALS — DIASTOLIC BLOOD PRESSURE: 49 MMHG | SYSTOLIC BLOOD PRESSURE: 82 MMHG

## 2018-05-10 VITALS — SYSTOLIC BLOOD PRESSURE: 97 MMHG | DIASTOLIC BLOOD PRESSURE: 62 MMHG

## 2018-05-10 VITALS — SYSTOLIC BLOOD PRESSURE: 114 MMHG | DIASTOLIC BLOOD PRESSURE: 47 MMHG

## 2018-05-10 VITALS — SYSTOLIC BLOOD PRESSURE: 99 MMHG | DIASTOLIC BLOOD PRESSURE: 64 MMHG

## 2018-05-10 VITALS — DIASTOLIC BLOOD PRESSURE: 53 MMHG | SYSTOLIC BLOOD PRESSURE: 111 MMHG

## 2018-05-10 VITALS — DIASTOLIC BLOOD PRESSURE: 48 MMHG | SYSTOLIC BLOOD PRESSURE: 99 MMHG

## 2018-05-10 VITALS — SYSTOLIC BLOOD PRESSURE: 100 MMHG | DIASTOLIC BLOOD PRESSURE: 46 MMHG

## 2018-05-10 VITALS — DIASTOLIC BLOOD PRESSURE: 62 MMHG | SYSTOLIC BLOOD PRESSURE: 98 MMHG

## 2018-05-10 VITALS — SYSTOLIC BLOOD PRESSURE: 88 MMHG | DIASTOLIC BLOOD PRESSURE: 55 MMHG

## 2018-05-10 VITALS — DIASTOLIC BLOOD PRESSURE: 45 MMHG | SYSTOLIC BLOOD PRESSURE: 106 MMHG

## 2018-05-10 VITALS — SYSTOLIC BLOOD PRESSURE: 137 MMHG | DIASTOLIC BLOOD PRESSURE: 75 MMHG

## 2018-05-10 VITALS — SYSTOLIC BLOOD PRESSURE: 105 MMHG | DIASTOLIC BLOOD PRESSURE: 47 MMHG

## 2018-05-10 VITALS — SYSTOLIC BLOOD PRESSURE: 104 MMHG | DIASTOLIC BLOOD PRESSURE: 47 MMHG

## 2018-05-10 VITALS — DIASTOLIC BLOOD PRESSURE: 53 MMHG | SYSTOLIC BLOOD PRESSURE: 110 MMHG

## 2018-05-10 VITALS — DIASTOLIC BLOOD PRESSURE: 53 MMHG | SYSTOLIC BLOOD PRESSURE: 112 MMHG

## 2018-05-10 VITALS — DIASTOLIC BLOOD PRESSURE: 42 MMHG | SYSTOLIC BLOOD PRESSURE: 84 MMHG

## 2018-05-10 VITALS — SYSTOLIC BLOOD PRESSURE: 98 MMHG | DIASTOLIC BLOOD PRESSURE: 57 MMHG

## 2018-05-10 VITALS — SYSTOLIC BLOOD PRESSURE: 82 MMHG | DIASTOLIC BLOOD PRESSURE: 53 MMHG

## 2018-05-10 VITALS — SYSTOLIC BLOOD PRESSURE: 103 MMHG | DIASTOLIC BLOOD PRESSURE: 51 MMHG

## 2018-05-10 VITALS — SYSTOLIC BLOOD PRESSURE: 96 MMHG | DIASTOLIC BLOOD PRESSURE: 54 MMHG

## 2018-05-10 VITALS — DIASTOLIC BLOOD PRESSURE: 51 MMHG | SYSTOLIC BLOOD PRESSURE: 103 MMHG

## 2018-05-10 VITALS — DIASTOLIC BLOOD PRESSURE: 41 MMHG | SYSTOLIC BLOOD PRESSURE: 90 MMHG

## 2018-05-10 VITALS — SYSTOLIC BLOOD PRESSURE: 155 MMHG | DIASTOLIC BLOOD PRESSURE: 73 MMHG

## 2018-05-10 VITALS — SYSTOLIC BLOOD PRESSURE: 99 MMHG | DIASTOLIC BLOOD PRESSURE: 50 MMHG

## 2018-05-10 VITALS — DIASTOLIC BLOOD PRESSURE: 85 MMHG | SYSTOLIC BLOOD PRESSURE: 99 MMHG

## 2018-05-10 VITALS — DIASTOLIC BLOOD PRESSURE: 44 MMHG | SYSTOLIC BLOOD PRESSURE: 107 MMHG

## 2018-05-10 VITALS — DIASTOLIC BLOOD PRESSURE: 51 MMHG | SYSTOLIC BLOOD PRESSURE: 99 MMHG

## 2018-05-10 VITALS — SYSTOLIC BLOOD PRESSURE: 149 MMHG | DIASTOLIC BLOOD PRESSURE: 52 MMHG

## 2018-05-10 VITALS — SYSTOLIC BLOOD PRESSURE: 122 MMHG | DIASTOLIC BLOOD PRESSURE: 68 MMHG

## 2018-05-10 VITALS — DIASTOLIC BLOOD PRESSURE: 56 MMHG | SYSTOLIC BLOOD PRESSURE: 102 MMHG

## 2018-05-10 LAB
ANION GAP SERPL CALC-SCNC: 12 MMOL/L (ref 8–16)
BASOPHILS NFR BLD AUTO: 0 % (ref 0–2)
BUN SERPL-MCNC: 118 MG/DL (ref 7–18)
CALCIUM SERPL-MCNC: 8 MG/DL (ref 8.5–10.1)
CHLORIDE SERPL-SCNC: 99 MMOL/L (ref 98–107)
CK MB SERPL-MCNC: 3.5 U/L (ref 0–3.6)
CK SERPL-CCNC: 471 UL (ref 21–232)
CO2 SERPL-SCNC: 27.8 MMOL/L (ref 21–32)
CREAT SERPL-MCNC: 3.3 MG/DL (ref 0.6–1.3)
EOSINOPHIL NFR BLD: 0.1 % (ref 0–7)
ERYTHROCYTE [DISTWIDTH] IN BLOOD BY AUTOMATED COUNT: 12.6 % (ref 11.5–14.5)
GLUCOSE SERPL-MCNC: 283 MG/DL (ref 74–106)
HCT VFR BLD CALC: 28.1 % (ref 42–54)
HGB BLD-MCNC: 9.2 G/DL (ref 13.5–17.5)
IMM GRANULOCYTES NFR BLD: 0.6 % (ref 0–5)
LYMPHOCYTES NFR BLD AUTO: 4.2 % (ref 15–50)
MCH RBC QN AUTO: 31.4 PG (ref 26–34)
MCHC RBC AUTO-ENTMCNC: 32.7 G/DL (ref 31–37)
MCV RBC: 95.9 FL (ref 80–100)
MONOCYTES NFR BLD: 6.8 % (ref 2–11)
NEUTROPHILS NFR BLD AUTO: 88.3 % (ref 40–80)
OSMOLALITY SERPL CALC.SUM OF ELEC: 315 MOSM/KG (ref 275–300)
PLATELET # BLD: 259 10X3/UL (ref 130–400)
PMV BLD AUTO: 11.5 FL (ref 7.4–10.4)
POTASSIUM SERPL-SCNC: 4.8 MMOL/L (ref 3.5–5.1)
RBC # BLD AUTO: 2.93 10X6/UL (ref 4.2–6.1)
SODIUM SERPL-SCNC: 134 MMOL/L (ref 136–145)
WBC # BLD AUTO: 17.7 10X3/UL (ref 4.8–10.8)

## 2018-05-10 PROCEDURE — 0W9930Z DRAINAGE OF RIGHT PLEURAL CAVITY WITH DRAINAGE DEVICE, PERCUTANEOUS APPROACH: ICD-10-PCS | Performed by: RADIOLOGY

## 2018-05-11 VITALS — SYSTOLIC BLOOD PRESSURE: 115 MMHG | DIASTOLIC BLOOD PRESSURE: 58 MMHG

## 2018-05-11 VITALS — DIASTOLIC BLOOD PRESSURE: 55 MMHG | SYSTOLIC BLOOD PRESSURE: 100 MMHG

## 2018-05-11 VITALS — DIASTOLIC BLOOD PRESSURE: 45 MMHG | SYSTOLIC BLOOD PRESSURE: 107 MMHG

## 2018-05-11 VITALS — SYSTOLIC BLOOD PRESSURE: 109 MMHG | DIASTOLIC BLOOD PRESSURE: 51 MMHG

## 2018-05-11 VITALS — DIASTOLIC BLOOD PRESSURE: 55 MMHG | SYSTOLIC BLOOD PRESSURE: 107 MMHG

## 2018-05-11 VITALS — SYSTOLIC BLOOD PRESSURE: 109 MMHG | DIASTOLIC BLOOD PRESSURE: 54 MMHG

## 2018-05-11 VITALS — SYSTOLIC BLOOD PRESSURE: 111 MMHG | DIASTOLIC BLOOD PRESSURE: 46 MMHG

## 2018-05-11 VITALS — SYSTOLIC BLOOD PRESSURE: 115 MMHG | DIASTOLIC BLOOD PRESSURE: 71 MMHG

## 2018-05-11 VITALS — DIASTOLIC BLOOD PRESSURE: 56 MMHG | SYSTOLIC BLOOD PRESSURE: 106 MMHG

## 2018-05-11 VITALS — DIASTOLIC BLOOD PRESSURE: 61 MMHG | SYSTOLIC BLOOD PRESSURE: 124 MMHG

## 2018-05-11 VITALS — SYSTOLIC BLOOD PRESSURE: 115 MMHG | DIASTOLIC BLOOD PRESSURE: 50 MMHG

## 2018-05-11 VITALS — DIASTOLIC BLOOD PRESSURE: 43 MMHG | SYSTOLIC BLOOD PRESSURE: 113 MMHG

## 2018-05-11 VITALS — SYSTOLIC BLOOD PRESSURE: 121 MMHG | DIASTOLIC BLOOD PRESSURE: 59 MMHG

## 2018-05-11 VITALS — DIASTOLIC BLOOD PRESSURE: 86 MMHG | SYSTOLIC BLOOD PRESSURE: 105 MMHG

## 2018-05-11 VITALS — SYSTOLIC BLOOD PRESSURE: 118 MMHG | DIASTOLIC BLOOD PRESSURE: 84 MMHG

## 2018-05-11 VITALS — SYSTOLIC BLOOD PRESSURE: 111 MMHG | DIASTOLIC BLOOD PRESSURE: 56 MMHG

## 2018-05-11 VITALS — DIASTOLIC BLOOD PRESSURE: 51 MMHG | SYSTOLIC BLOOD PRESSURE: 99 MMHG

## 2018-05-11 VITALS — DIASTOLIC BLOOD PRESSURE: 54 MMHG | SYSTOLIC BLOOD PRESSURE: 106 MMHG

## 2018-05-11 VITALS — DIASTOLIC BLOOD PRESSURE: 50 MMHG | SYSTOLIC BLOOD PRESSURE: 100 MMHG

## 2018-05-11 VITALS — DIASTOLIC BLOOD PRESSURE: 40 MMHG | SYSTOLIC BLOOD PRESSURE: 117 MMHG

## 2018-05-11 VITALS — SYSTOLIC BLOOD PRESSURE: 125 MMHG | DIASTOLIC BLOOD PRESSURE: 60 MMHG

## 2018-05-11 VITALS — DIASTOLIC BLOOD PRESSURE: 54 MMHG | SYSTOLIC BLOOD PRESSURE: 125 MMHG

## 2018-05-11 VITALS — DIASTOLIC BLOOD PRESSURE: 55 MMHG | SYSTOLIC BLOOD PRESSURE: 111 MMHG

## 2018-05-11 VITALS — DIASTOLIC BLOOD PRESSURE: 52 MMHG | SYSTOLIC BLOOD PRESSURE: 127 MMHG

## 2018-05-11 LAB
ALBUMIN SERPL-MCNC: 1.9 G/DL (ref 3.4–5)
ALP SERPL-CCNC: 29 U/L (ref 46–116)
ALT SERPL-CCNC: 20 U/L (ref 10–68)
ANION GAP SERPL CALC-SCNC: 15.5 MMOL/L (ref 8–16)
BASOPHILS NFR BLD AUTO: 0 % (ref 0–2)
BILIRUB DIRECT SERPL-MCNC: 0.16 MG/DL (ref 0–0.3)
BILIRUB INDIRECT SERPL-MCNC: 0.25 MG/DL (ref 0–1)
BILIRUB SERPL-MCNC: 0.41 MG/DL (ref 0.2–1.3)
BUN SERPL-MCNC: 129 MG/DL (ref 7–18)
CALCIUM SERPL-MCNC: 7.9 MG/DL (ref 8.5–10.1)
CHLORIDE SERPL-SCNC: 103 MMOL/L (ref 98–107)
CO2 SERPL-SCNC: 25.9 MMOL/L (ref 21–32)
CREAT SERPL-MCNC: 3.3 MG/DL (ref 0.6–1.3)
EOSINOPHIL NFR BLD: 0 % (ref 0–7)
ERYTHROCYTE [DISTWIDTH] IN BLOOD BY AUTOMATED COUNT: 12.6 % (ref 11.5–14.5)
GLOBULIN SER-MCNC: 2.7 G/L
GLUCOSE SERPL-MCNC: 239 MG/DL (ref 74–106)
HCT VFR BLD CALC: 22.9 % (ref 42–54)
HGB BLD-MCNC: 7.6 G/DL (ref 13.5–17.5)
IMM GRANULOCYTES NFR BLD: 0.4 % (ref 0–5)
LYMPHOCYTES NFR BLD AUTO: 5 % (ref 15–50)
MAGNESIUM SERPL-MCNC: 2.7 MG/DL (ref 1.8–2.4)
MCH RBC QN AUTO: 31.5 PG (ref 26–34)
MCHC RBC AUTO-ENTMCNC: 33.2 G/DL (ref 31–37)
MCV RBC: 95 FL (ref 80–100)
MONOCYTES NFR BLD: 4.7 % (ref 2–11)
NEUTROPHILS NFR BLD AUTO: 89.9 % (ref 40–80)
OSMOLALITY SERPL CALC.SUM OF ELEC: 328 MOSM/KG (ref 275–300)
PHOSPHATE SERPL-MCNC: 7.8 MG/DL (ref 2.5–4.9)
PLATELET # BLD: 178 10X3/UL (ref 130–400)
PMV BLD AUTO: 11 FL (ref 7.4–10.4)
POTASSIUM SERPL-SCNC: 4.4 MMOL/L (ref 3.5–5.1)
PROT SERPL-MCNC: 4.6 G/DL (ref 6.4–8.2)
RBC # BLD AUTO: 2.41 10X6/UL (ref 4.2–6.1)
SODIUM SERPL-SCNC: 140 MMOL/L (ref 136–145)
WBC # BLD AUTO: 12.7 10X3/UL (ref 4.8–10.8)

## 2018-05-12 VITALS — DIASTOLIC BLOOD PRESSURE: 44 MMHG | SYSTOLIC BLOOD PRESSURE: 130 MMHG

## 2018-05-12 VITALS — SYSTOLIC BLOOD PRESSURE: 120 MMHG | DIASTOLIC BLOOD PRESSURE: 46 MMHG

## 2018-05-12 VITALS — SYSTOLIC BLOOD PRESSURE: 138 MMHG | DIASTOLIC BLOOD PRESSURE: 49 MMHG

## 2018-05-12 VITALS — SYSTOLIC BLOOD PRESSURE: 157 MMHG | DIASTOLIC BLOOD PRESSURE: 57 MMHG

## 2018-05-12 VITALS — SYSTOLIC BLOOD PRESSURE: 158 MMHG | DIASTOLIC BLOOD PRESSURE: 55 MMHG

## 2018-05-12 VITALS — DIASTOLIC BLOOD PRESSURE: 59 MMHG | SYSTOLIC BLOOD PRESSURE: 165 MMHG

## 2018-05-12 VITALS — SYSTOLIC BLOOD PRESSURE: 126 MMHG | DIASTOLIC BLOOD PRESSURE: 62 MMHG

## 2018-05-12 VITALS — SYSTOLIC BLOOD PRESSURE: 119 MMHG | DIASTOLIC BLOOD PRESSURE: 43 MMHG

## 2018-05-12 VITALS — SYSTOLIC BLOOD PRESSURE: 132 MMHG | DIASTOLIC BLOOD PRESSURE: 72 MMHG

## 2018-05-12 VITALS — SYSTOLIC BLOOD PRESSURE: 126 MMHG | DIASTOLIC BLOOD PRESSURE: 49 MMHG

## 2018-05-12 VITALS — SYSTOLIC BLOOD PRESSURE: 129 MMHG | DIASTOLIC BLOOD PRESSURE: 48 MMHG

## 2018-05-12 VITALS — SYSTOLIC BLOOD PRESSURE: 124 MMHG | DIASTOLIC BLOOD PRESSURE: 46 MMHG

## 2018-05-12 VITALS — SYSTOLIC BLOOD PRESSURE: 114 MMHG | DIASTOLIC BLOOD PRESSURE: 49 MMHG

## 2018-05-12 VITALS — DIASTOLIC BLOOD PRESSURE: 46 MMHG | SYSTOLIC BLOOD PRESSURE: 126 MMHG

## 2018-05-12 VITALS — SYSTOLIC BLOOD PRESSURE: 126 MMHG | DIASTOLIC BLOOD PRESSURE: 54 MMHG

## 2018-05-12 VITALS — DIASTOLIC BLOOD PRESSURE: 41 MMHG | SYSTOLIC BLOOD PRESSURE: 122 MMHG

## 2018-05-12 VITALS — DIASTOLIC BLOOD PRESSURE: 49 MMHG | SYSTOLIC BLOOD PRESSURE: 147 MMHG

## 2018-05-12 VITALS — SYSTOLIC BLOOD PRESSURE: 141 MMHG | DIASTOLIC BLOOD PRESSURE: 54 MMHG

## 2018-05-12 VITALS — DIASTOLIC BLOOD PRESSURE: 42 MMHG | SYSTOLIC BLOOD PRESSURE: 139 MMHG

## 2018-05-12 VITALS — SYSTOLIC BLOOD PRESSURE: 142 MMHG | DIASTOLIC BLOOD PRESSURE: 43 MMHG

## 2018-05-12 VITALS — DIASTOLIC BLOOD PRESSURE: 63 MMHG | SYSTOLIC BLOOD PRESSURE: 136 MMHG

## 2018-05-12 VITALS — DIASTOLIC BLOOD PRESSURE: 48 MMHG | SYSTOLIC BLOOD PRESSURE: 136 MMHG

## 2018-05-12 VITALS — SYSTOLIC BLOOD PRESSURE: 163 MMHG | DIASTOLIC BLOOD PRESSURE: 60 MMHG

## 2018-05-12 VITALS — SYSTOLIC BLOOD PRESSURE: 115 MMHG | DIASTOLIC BLOOD PRESSURE: 43 MMHG

## 2018-05-12 LAB
ANION GAP SERPL CALC-SCNC: 12.7 MMOL/L (ref 8–16)
BASOPHILS NFR BLD AUTO: 0 % (ref 0–2)
BUN SERPL-MCNC: 144 MG/DL (ref 7–18)
CALCIUM SERPL-MCNC: 8 MG/DL (ref 8.5–10.1)
CHLORIDE SERPL-SCNC: 109 MMOL/L (ref 98–107)
CO2 SERPL-SCNC: 26.8 MMOL/L (ref 21–32)
CREAT SERPL-MCNC: 2.4 MG/DL (ref 0.6–1.3)
EOSINOPHIL NFR BLD: 0 % (ref 0–7)
ERYTHROCYTE [DISTWIDTH] IN BLOOD BY AUTOMATED COUNT: 16.2 % (ref 11.5–14.5)
GLUCOSE SERPL-MCNC: 268 MG/DL (ref 74–106)
HCT VFR BLD CALC: 23.1 % (ref 42–54)
HGB BLD-MCNC: 7.8 G/DL (ref 13.5–17.5)
IMM GRANULOCYTES NFR BLD: 0.5 % (ref 0–5)
LYMPHOCYTES NFR BLD AUTO: 4.8 % (ref 15–50)
MCH RBC QN AUTO: 30.4 PG (ref 26–34)
MCHC RBC AUTO-ENTMCNC: 33.8 G/DL (ref 31–37)
MCV RBC: 89.9 FL (ref 80–100)
MONOCYTES NFR BLD: 4.2 % (ref 2–11)
NEUTROPHILS NFR BLD AUTO: 90.5 % (ref 40–80)
OSMOLALITY SERPL CALC.SUM OF ELEC: 342 MOSM/KG (ref 275–300)
PLATELET # BLD: 132 10X3/UL (ref 130–400)
PMV BLD AUTO: 11.1 FL (ref 7.4–10.4)
POTASSIUM SERPL-SCNC: 4.5 MMOL/L (ref 3.5–5.1)
RBC # BLD AUTO: 2.57 10X6/UL (ref 4.2–6.1)
SODIUM SERPL-SCNC: 144 MMOL/L (ref 136–145)
WBC # BLD AUTO: 8.4 10X3/UL (ref 4.8–10.8)

## 2018-05-13 VITALS — SYSTOLIC BLOOD PRESSURE: 144 MMHG | DIASTOLIC BLOOD PRESSURE: 54 MMHG

## 2018-05-13 VITALS — SYSTOLIC BLOOD PRESSURE: 140 MMHG | DIASTOLIC BLOOD PRESSURE: 55 MMHG

## 2018-05-13 VITALS — DIASTOLIC BLOOD PRESSURE: 60 MMHG | SYSTOLIC BLOOD PRESSURE: 152 MMHG

## 2018-05-13 VITALS — DIASTOLIC BLOOD PRESSURE: 61 MMHG | SYSTOLIC BLOOD PRESSURE: 152 MMHG

## 2018-05-13 VITALS — SYSTOLIC BLOOD PRESSURE: 144 MMHG | DIASTOLIC BLOOD PRESSURE: 49 MMHG

## 2018-05-13 VITALS — SYSTOLIC BLOOD PRESSURE: 134 MMHG | DIASTOLIC BLOOD PRESSURE: 70 MMHG

## 2018-05-13 VITALS — SYSTOLIC BLOOD PRESSURE: 149 MMHG | DIASTOLIC BLOOD PRESSURE: 78 MMHG

## 2018-05-13 VITALS — DIASTOLIC BLOOD PRESSURE: 50 MMHG | SYSTOLIC BLOOD PRESSURE: 132 MMHG

## 2018-05-13 VITALS — SYSTOLIC BLOOD PRESSURE: 143 MMHG | DIASTOLIC BLOOD PRESSURE: 98 MMHG

## 2018-05-13 VITALS — DIASTOLIC BLOOD PRESSURE: 74 MMHG | SYSTOLIC BLOOD PRESSURE: 158 MMHG

## 2018-05-13 VITALS — SYSTOLIC BLOOD PRESSURE: 160 MMHG | DIASTOLIC BLOOD PRESSURE: 80 MMHG

## 2018-05-13 LAB
ANION GAP SERPL CALC-SCNC: 8.4 MMOL/L (ref 8–16)
APTT BLD: 22.4 SECONDS (ref 22.8–39.4)
BASOPHILS NFR BLD AUTO: 0 % (ref 0–2)
BUN SERPL-MCNC: 109 MG/DL (ref 7–18)
CALCIUM SERPL-MCNC: 7.9 MG/DL (ref 8.5–10.1)
CHLORIDE SERPL-SCNC: 111 MMOL/L (ref 98–107)
CK SERPL-CCNC: 97 UL (ref 21–232)
CO2 SERPL-SCNC: 28.7 MMOL/L (ref 21–32)
CREAT SERPL-MCNC: 1.6 MG/DL (ref 0.6–1.3)
D DIMER PPP FEU-MCNC: 0.81 UG/MLFEU (ref 0.2–0.54)
EOSINOPHIL NFR BLD: 0 % (ref 0–7)
ERYTHROCYTE [DISTWIDTH] IN BLOOD BY AUTOMATED COUNT: 15.7 % (ref 11.5–14.5)
FIBRINOGEN PPP-MCNC: 341 MG/DL (ref 239–481)
GLUCOSE SERPL-MCNC: 280 MG/DL (ref 74–106)
HCT VFR BLD CALC: 26.3 % (ref 42–54)
HGB BLD-MCNC: 8.8 G/DL (ref 13.5–17.5)
IMM GRANULOCYTES NFR BLD: 0.9 % (ref 0–5)
INR PPP: 1.12 (ref 0.85–1.17)
LDH1 SERPL-CCNC: 186 U/L (ref 85–227)
LYMPHOCYTES NFR BLD AUTO: 4.9 % (ref 15–50)
MCH RBC QN AUTO: 30 PG (ref 26–34)
MCHC RBC AUTO-ENTMCNC: 33.5 G/DL (ref 31–37)
MCV RBC: 89.8 FL (ref 80–100)
MONOCYTES NFR BLD: 5.1 % (ref 2–11)
NEUTROPHILS NFR BLD AUTO: 89.1 % (ref 40–80)
OSMOLALITY SERPL CALC.SUM OF ELEC: 328 MOSM/KG (ref 275–300)
PLATELET # BLD: 123 10X3/UL (ref 130–400)
PMV BLD AUTO: 11.1 FL (ref 7.4–10.4)
POTASSIUM SERPL-SCNC: 5.1 MMOL/L (ref 3.5–5.1)
PROTHROMBIN TIME: 14 SECONDS (ref 11.6–15)
RBC # BLD AUTO: 2.93 10X6/UL (ref 4.2–6.1)
SODIUM SERPL-SCNC: 143 MMOL/L (ref 136–145)
WBC # BLD AUTO: 9.2 10X3/UL (ref 4.8–10.8)

## 2018-05-14 VITALS — SYSTOLIC BLOOD PRESSURE: 130 MMHG | DIASTOLIC BLOOD PRESSURE: 65 MMHG

## 2018-05-14 VITALS — SYSTOLIC BLOOD PRESSURE: 142 MMHG | DIASTOLIC BLOOD PRESSURE: 64 MMHG

## 2018-05-14 VITALS — SYSTOLIC BLOOD PRESSURE: 167 MMHG | DIASTOLIC BLOOD PRESSURE: 53 MMHG

## 2018-05-14 VITALS — SYSTOLIC BLOOD PRESSURE: 163 MMHG | DIASTOLIC BLOOD PRESSURE: 75 MMHG

## 2018-05-14 VITALS — DIASTOLIC BLOOD PRESSURE: 69 MMHG | SYSTOLIC BLOOD PRESSURE: 132 MMHG

## 2018-05-14 LAB
ALBUMIN SERPL-MCNC: 1.8 G/DL (ref 3.4–5)
ALP SERPL-CCNC: 76 U/L (ref 46–116)
ALT SERPL-CCNC: 37 U/L (ref 10–68)
ANION GAP SERPL CALC-SCNC: 11.4 MMOL/L (ref 8–16)
BASOPHILS NFR BLD AUTO: 0 % (ref 0–2)
BILIRUB SERPL-MCNC: 0.52 MG/DL (ref 0.2–1.3)
BUN SERPL-MCNC: 83 MG/DL (ref 7–18)
CALCIUM SERPL-MCNC: 7.9 MG/DL (ref 8.5–10.1)
CHLORIDE SERPL-SCNC: 109 MMOL/L (ref 98–107)
CO2 SERPL-SCNC: 27.6 MMOL/L (ref 21–32)
CREAT SERPL-MCNC: 1.3 MG/DL (ref 0.6–1.3)
EOSINOPHIL NFR BLD: 0 % (ref 0–7)
ERYTHROCYTE [DISTWIDTH] IN BLOOD BY AUTOMATED COUNT: 15.5 % (ref 11.5–14.5)
GLOBULIN SER-MCNC: 2.6 G/L
GLUCOSE SERPL-MCNC: 284 MG/DL (ref 74–106)
HCT VFR BLD CALC: 28.4 % (ref 42–54)
HGB BLD-MCNC: 9.4 G/DL (ref 13.5–17.5)
IMM GRANULOCYTES NFR BLD: 0.8 % (ref 0–5)
LYMPHOCYTES NFR BLD AUTO: 5.7 % (ref 15–50)
MCH RBC QN AUTO: 30.4 PG (ref 26–34)
MCHC RBC AUTO-ENTMCNC: 33.1 G/DL (ref 31–37)
MCV RBC: 91.9 FL (ref 80–100)
MONOCYTES NFR BLD: 2 % (ref 2–11)
NEUTROPHILS NFR BLD AUTO: 91.5 % (ref 40–80)
OSMOLALITY SERPL CALC.SUM OF ELEC: 317 MOSM/KG (ref 275–300)
PLATELET # BLD: 143 10X3/UL (ref 130–400)
PMV BLD AUTO: 11.5 FL (ref 7.4–10.4)
POTASSIUM SERPL-SCNC: 6 MMOL/L (ref 3.5–5.1)
PROT SERPL-MCNC: 4.4 G/DL (ref 6.4–8.2)
RBC # BLD AUTO: 3.09 10X6/UL (ref 4.2–6.1)
SODIUM SERPL-SCNC: 142 MMOL/L (ref 136–145)
WBC # BLD AUTO: 13.8 10X3/UL (ref 4.8–10.8)

## 2018-05-15 VITALS — DIASTOLIC BLOOD PRESSURE: 74 MMHG | SYSTOLIC BLOOD PRESSURE: 168 MMHG

## 2018-05-15 VITALS — DIASTOLIC BLOOD PRESSURE: 64 MMHG | SYSTOLIC BLOOD PRESSURE: 154 MMHG

## 2018-05-15 VITALS — SYSTOLIC BLOOD PRESSURE: 154 MMHG | DIASTOLIC BLOOD PRESSURE: 73 MMHG

## 2018-05-15 VITALS — SYSTOLIC BLOOD PRESSURE: 170 MMHG | DIASTOLIC BLOOD PRESSURE: 76 MMHG

## 2018-05-15 VITALS — SYSTOLIC BLOOD PRESSURE: 154 MMHG | DIASTOLIC BLOOD PRESSURE: 64 MMHG

## 2018-05-15 LAB
ALBUMIN SERPL-MCNC: 1.8 G/DL (ref 3.4–5)
ALP SERPL-CCNC: 59 U/L (ref 46–116)
ALT SERPL-CCNC: 31 U/L (ref 10–68)
ANION GAP SERPL CALC-SCNC: 4.4 MMOL/L (ref 8–16)
BASOPHILS NFR BLD AUTO: 0.1 % (ref 0–2)
BILIRUB SERPL-MCNC: 0.8 MG/DL (ref 0.2–1.3)
BUN SERPL-MCNC: 63 MG/DL (ref 7–18)
CALCIUM SERPL-MCNC: 8.3 MG/DL (ref 8.5–10.1)
CHLORIDE SERPL-SCNC: 113 MMOL/L (ref 98–107)
CO2 SERPL-SCNC: 33.1 MMOL/L (ref 21–32)
CREAT SERPL-MCNC: 1.3 MG/DL (ref 0.6–1.3)
EOSINOPHIL NFR BLD: 0.1 % (ref 0–7)
ERYTHROCYTE [DISTWIDTH] IN BLOOD BY AUTOMATED COUNT: 15.1 % (ref 11.5–14.5)
GLOBULIN SER-MCNC: 2.4 G/L
GLUCOSE SERPL-MCNC: 237 MG/DL (ref 74–106)
HCT VFR BLD CALC: 27.7 % (ref 42–54)
HGB BLD-MCNC: 8.9 G/DL (ref 13.5–17.5)
IMM GRANULOCYTES NFR BLD: 1.2 % (ref 0–5)
LYMPHOCYTES NFR BLD AUTO: 4 % (ref 15–50)
MCH RBC QN AUTO: 30 PG (ref 26–34)
MCHC RBC AUTO-ENTMCNC: 32.1 G/DL (ref 31–37)
MCV RBC: 93.3 FL (ref 80–100)
MONOCYTES NFR BLD: 4.9 % (ref 2–11)
NEUTROPHILS NFR BLD AUTO: 89.7 % (ref 40–80)
OSMOLALITY SERPL CALC.SUM OF ELEC: 314 MOSM/KG (ref 275–300)
PLATELET # BLD: 130 10X3/UL (ref 130–400)
PMV BLD AUTO: 10.9 FL (ref 7.4–10.4)
POTASSIUM SERPL-SCNC: 5.5 MMOL/L (ref 3.5–5.1)
PROT SERPL-MCNC: 4.2 G/DL (ref 6.4–8.2)
RBC # BLD AUTO: 2.97 10X6/UL (ref 4.2–6.1)
SODIUM SERPL-SCNC: 145 MMOL/L (ref 136–145)
WBC # BLD AUTO: 11.3 10X3/UL (ref 4.8–10.8)

## 2018-05-16 VITALS — DIASTOLIC BLOOD PRESSURE: 83 MMHG | SYSTOLIC BLOOD PRESSURE: 168 MMHG

## 2018-05-16 VITALS — SYSTOLIC BLOOD PRESSURE: 167 MMHG | DIASTOLIC BLOOD PRESSURE: 70 MMHG

## 2018-05-16 VITALS — SYSTOLIC BLOOD PRESSURE: 174 MMHG | DIASTOLIC BLOOD PRESSURE: 84 MMHG

## 2018-05-16 VITALS — DIASTOLIC BLOOD PRESSURE: 74 MMHG | SYSTOLIC BLOOD PRESSURE: 154 MMHG

## 2018-05-16 VITALS — SYSTOLIC BLOOD PRESSURE: 187 MMHG | DIASTOLIC BLOOD PRESSURE: 74 MMHG

## 2018-05-16 VITALS — DIASTOLIC BLOOD PRESSURE: 71 MMHG | SYSTOLIC BLOOD PRESSURE: 171 MMHG

## 2018-05-16 LAB
ALBUMIN SERPL-MCNC: 1.8 G/DL (ref 3.4–5)
ALP SERPL-CCNC: 64 U/L (ref 46–116)
ALT SERPL-CCNC: 33 U/L (ref 10–68)
ANION GAP SERPL CALC-SCNC: 7.9 MMOL/L (ref 8–16)
BASOPHILS NFR BLD AUTO: 0.1 % (ref 0–2)
BILIRUB SERPL-MCNC: 0.88 MG/DL (ref 0.2–1.3)
BUN SERPL-MCNC: 50 MG/DL (ref 7–18)
CALCIUM SERPL-MCNC: 8.4 MG/DL (ref 8.5–10.1)
CHLORIDE SERPL-SCNC: 110 MMOL/L (ref 98–107)
CO2 SERPL-SCNC: 32.3 MMOL/L (ref 21–32)
CREAT SERPL-MCNC: 1.1 MG/DL (ref 0.6–1.3)
EOSINOPHIL NFR BLD: 0.4 % (ref 0–7)
ERYTHROCYTE [DISTWIDTH] IN BLOOD BY AUTOMATED COUNT: 14.7 % (ref 11.5–14.5)
GLOBULIN SER-MCNC: 2.6 G/L
GLUCOSE SERPL-MCNC: 200 MG/DL (ref 74–106)
HCT VFR BLD CALC: 30 % (ref 42–54)
HGB BLD-MCNC: 9.7 G/DL (ref 13.5–17.5)
IMM GRANULOCYTES NFR BLD: 1.8 % (ref 0–5)
LYMPHOCYTES NFR BLD AUTO: 3.5 % (ref 15–50)
MCH RBC QN AUTO: 30.4 PG (ref 26–34)
MCHC RBC AUTO-ENTMCNC: 32.3 G/DL (ref 31–37)
MCV RBC: 94 FL (ref 80–100)
MONOCYTES NFR BLD: 4.5 % (ref 2–11)
NEUTROPHILS NFR BLD AUTO: 89.7 % (ref 40–80)
OSMOLALITY SERPL CALC.SUM OF ELEC: 307 MOSM/KG (ref 275–300)
PLATELET # BLD: 149 10X3/UL (ref 130–400)
PMV BLD AUTO: 11 FL (ref 7.4–10.4)
POTASSIUM SERPL-SCNC: 5.2 MMOL/L (ref 3.5–5.1)
PROT SERPL-MCNC: 4.4 G/DL (ref 6.4–8.2)
RBC # BLD AUTO: 3.19 10X6/UL (ref 4.2–6.1)
SODIUM SERPL-SCNC: 145 MMOL/L (ref 136–145)
WBC # BLD AUTO: 14.2 10X3/UL (ref 4.8–10.8)

## 2018-05-17 VITALS — SYSTOLIC BLOOD PRESSURE: 190 MMHG | DIASTOLIC BLOOD PRESSURE: 77 MMHG

## 2018-05-17 VITALS — SYSTOLIC BLOOD PRESSURE: 155 MMHG | DIASTOLIC BLOOD PRESSURE: 86 MMHG

## 2018-05-17 VITALS — DIASTOLIC BLOOD PRESSURE: 74 MMHG | SYSTOLIC BLOOD PRESSURE: 174 MMHG

## 2018-05-17 VITALS — SYSTOLIC BLOOD PRESSURE: 172 MMHG | DIASTOLIC BLOOD PRESSURE: 77 MMHG

## 2018-05-17 VITALS — DIASTOLIC BLOOD PRESSURE: 76 MMHG | SYSTOLIC BLOOD PRESSURE: 157 MMHG

## 2018-05-17 VITALS — DIASTOLIC BLOOD PRESSURE: 84 MMHG | SYSTOLIC BLOOD PRESSURE: 164 MMHG

## 2018-05-17 LAB
ALBUMIN SERPL-MCNC: 1.8 G/DL (ref 3.4–5)
ALP SERPL-CCNC: 68 U/L (ref 46–116)
ALT SERPL-CCNC: 26 U/L (ref 10–68)
ANION GAP SERPL CALC-SCNC: 6.6 MMOL/L (ref 8–16)
BASOPHILS NFR BLD AUTO: 0.1 % (ref 0–2)
BILIRUB SERPL-MCNC: 2.1 MG/DL (ref 0.2–1.3)
BUN SERPL-MCNC: 44 MG/DL (ref 7–18)
CALCIUM SERPL-MCNC: 8 MG/DL (ref 8.5–10.1)
CHLORIDE SERPL-SCNC: 112 MMOL/L (ref 98–107)
CO2 SERPL-SCNC: 33.3 MMOL/L (ref 21–32)
CREAT SERPL-MCNC: 1.1 MG/DL (ref 0.6–1.3)
EOSINOPHIL NFR BLD: 0.6 % (ref 0–7)
ERYTHROCYTE [DISTWIDTH] IN BLOOD BY AUTOMATED COUNT: 13.8 % (ref 11.5–14.5)
GLOBULIN SER-MCNC: 2.2 G/L
GLUCOSE SERPL-MCNC: 197 MG/DL (ref 74–106)
HCT VFR BLD CALC: 27.7 % (ref 42–54)
HGB BLD-MCNC: 9 G/DL (ref 13.5–17.5)
IMM GRANULOCYTES NFR BLD: 3 % (ref 0–5)
LYMPHOCYTES NFR BLD AUTO: 3.6 % (ref 15–50)
MCH RBC QN AUTO: 31.3 PG (ref 26–34)
MCHC RBC AUTO-ENTMCNC: 32.5 G/DL (ref 31–37)
MCV RBC: 96.2 FL (ref 80–100)
MONOCYTES NFR BLD: 4.5 % (ref 2–11)
NEUTROPHILS NFR BLD AUTO: 88.2 % (ref 40–80)
OSMOLALITY SERPL CALC.SUM OF ELEC: 307 MOSM/KG (ref 275–300)
PLATELET # BLD: 155 10X3/UL (ref 130–400)
PMV BLD AUTO: 10.5 FL (ref 7.4–10.4)
POTASSIUM SERPL-SCNC: 4.9 MMOL/L (ref 3.5–5.1)
PROT SERPL-MCNC: 4 G/DL (ref 6.4–8.2)
RBC # BLD AUTO: 2.88 10X6/UL (ref 4.2–6.1)
SODIUM SERPL-SCNC: 147 MMOL/L (ref 136–145)
WBC # BLD AUTO: 14.8 10X3/UL (ref 4.8–10.8)

## 2018-05-18 VITALS — SYSTOLIC BLOOD PRESSURE: 171 MMHG | DIASTOLIC BLOOD PRESSURE: 93 MMHG

## 2018-05-18 VITALS — SYSTOLIC BLOOD PRESSURE: 163 MMHG | DIASTOLIC BLOOD PRESSURE: 94 MMHG

## 2018-05-18 VITALS — SYSTOLIC BLOOD PRESSURE: 197 MMHG | DIASTOLIC BLOOD PRESSURE: 86 MMHG

## 2018-05-18 LAB
ALBUMIN SERPL-MCNC: 1.7 G/DL (ref 3.4–5)
ALP SERPL-CCNC: 56 U/L (ref 46–116)
ALT SERPL-CCNC: 23 U/L (ref 10–68)
ANION GAP SERPL CALC-SCNC: 5.3 MMOL/L (ref 8–16)
BASOPHILS NFR BLD AUTO: 0.2 % (ref 0–2)
BILIRUB SERPL-MCNC: 1.2 MG/DL (ref 0.2–1.3)
BUN SERPL-MCNC: 41 MG/DL (ref 7–18)
CALCIUM SERPL-MCNC: 8.3 MG/DL (ref 8.5–10.1)
CHLORIDE SERPL-SCNC: 112 MMOL/L (ref 98–107)
CO2 SERPL-SCNC: 31.9 MMOL/L (ref 21–32)
CREAT SERPL-MCNC: 1.1 MG/DL (ref 0.6–1.3)
EOSINOPHIL NFR BLD: 2.5 % (ref 0–7)
ERYTHROCYTE [DISTWIDTH] IN BLOOD BY AUTOMATED COUNT: 13.5 % (ref 11.5–14.5)
GLOBULIN SER-MCNC: 2.4 G/L
GLUCOSE SERPL-MCNC: 161 MG/DL (ref 74–106)
HCT VFR BLD CALC: 25.3 % (ref 42–54)
HGB BLD-MCNC: 8.2 G/DL (ref 13.5–17.5)
IMM GRANULOCYTES NFR BLD: 2.4 % (ref 0–5)
LYMPHOCYTES NFR BLD AUTO: 11.5 % (ref 15–50)
MCH RBC QN AUTO: 31.7 PG (ref 26–34)
MCHC RBC AUTO-ENTMCNC: 32.4 G/DL (ref 31–37)
MCV RBC: 97.7 FL (ref 80–100)
MONOCYTES NFR BLD: 5.7 % (ref 2–11)
NEUTROPHILS NFR BLD AUTO: 77.7 % (ref 40–80)
OSMOLALITY SERPL CALC.SUM OF ELEC: 301 MOSM/KG (ref 275–300)
PLATELET # BLD: 157 10X3/UL (ref 130–400)
PMV BLD AUTO: 10.5 FL (ref 7.4–10.4)
POTASSIUM SERPL-SCNC: 4.2 MMOL/L (ref 3.5–5.1)
PROT SERPL-MCNC: 4.1 G/DL (ref 6.4–8.2)
RBC # BLD AUTO: 2.59 10X6/UL (ref 4.2–6.1)
SODIUM SERPL-SCNC: 145 MMOL/L (ref 136–145)
WBC # BLD AUTO: 11.8 10X3/UL (ref 4.8–10.8)

## 2019-01-10 ENCOUNTER — HOSPITAL ENCOUNTER (INPATIENT)
Dept: HOSPITAL 84 - D.ER | Age: 79
LOS: 19 days | Discharge: NURSING, REHAB OR LONG-TERM CARE | DRG: 177 | End: 2019-01-29
Attending: FAMILY MEDICINE | Admitting: FAMILY MEDICINE
Payer: MEDICARE

## 2019-01-10 VITALS
WEIGHT: 225.47 LBS | BODY MASS INDEX: 36.24 KG/M2 | BODY MASS INDEX: 36.24 KG/M2 | BODY MASS INDEX: 36.24 KG/M2 | WEIGHT: 225.47 LBS | HEIGHT: 66 IN | HEIGHT: 66 IN

## 2019-01-10 VITALS — SYSTOLIC BLOOD PRESSURE: 176 MMHG | DIASTOLIC BLOOD PRESSURE: 85 MMHG

## 2019-01-10 VITALS — SYSTOLIC BLOOD PRESSURE: 183 MMHG | DIASTOLIC BLOOD PRESSURE: 95 MMHG

## 2019-01-10 VITALS — SYSTOLIC BLOOD PRESSURE: 197 MMHG | DIASTOLIC BLOOD PRESSURE: 92 MMHG

## 2019-01-10 VITALS — SYSTOLIC BLOOD PRESSURE: 209 MMHG | DIASTOLIC BLOOD PRESSURE: 107 MMHG

## 2019-01-10 VITALS — DIASTOLIC BLOOD PRESSURE: 82 MMHG | SYSTOLIC BLOOD PRESSURE: 170 MMHG

## 2019-01-10 VITALS — DIASTOLIC BLOOD PRESSURE: 87 MMHG | SYSTOLIC BLOOD PRESSURE: 173 MMHG

## 2019-01-10 DIAGNOSIS — J44.1: ICD-10-CM

## 2019-01-10 DIAGNOSIS — I11.0: ICD-10-CM

## 2019-01-10 DIAGNOSIS — J69.0: Primary | ICD-10-CM

## 2019-01-10 DIAGNOSIS — J44.0: ICD-10-CM

## 2019-01-10 DIAGNOSIS — I50.43: ICD-10-CM

## 2019-01-10 DIAGNOSIS — E11.65: ICD-10-CM

## 2019-01-10 DIAGNOSIS — E87.0: ICD-10-CM

## 2019-01-10 DIAGNOSIS — J96.11: ICD-10-CM

## 2019-01-10 DIAGNOSIS — E11.42: ICD-10-CM

## 2019-01-10 DIAGNOSIS — J96.01: ICD-10-CM

## 2019-01-10 LAB
ALBUMIN SERPL-MCNC: 2.9 G/DL (ref 3.4–5)
ALP SERPL-CCNC: 63 U/L (ref 46–116)
ALT SERPL-CCNC: 12 U/L (ref 10–68)
ANION GAP SERPL CALC-SCNC: 10.6 MMOL/L (ref 8–16)
BILIRUB SERPL-MCNC: 0.3 MG/DL (ref 0.2–1.3)
BUN SERPL-MCNC: 24 MG/DL (ref 7–18)
CALCIUM SERPL-MCNC: 9.3 MG/DL (ref 8.5–10.1)
CHLORIDE SERPL-SCNC: 106 MMOL/L (ref 98–107)
CO2 SERPL-SCNC: 34.3 MMOL/L (ref 21–32)
CREAT SERPL-MCNC: 1.2 MG/DL (ref 0.6–1.3)
ERYTHROCYTE [DISTWIDTH] IN BLOOD BY AUTOMATED COUNT: 12.2 % (ref 11.5–14.5)
GLOBULIN SER-MCNC: 3.9 G/L
GLUCOSE SERPL-MCNC: 138 MG/DL (ref 74–106)
HCT VFR BLD CALC: 45.5 % (ref 42–54)
HGB BLD-MCNC: 15.2 G/DL (ref 13.5–17.5)
LYMPHOCYTES NFR BLD AUTO: 10.5 % (ref 15–50)
MCH RBC QN AUTO: 33 PG (ref 26–34)
MCHC RBC AUTO-ENTMCNC: 33.4 G/DL (ref 31–37)
MCV RBC: 98.7 FL (ref 80–100)
NEUTROPHILS NFR BLD AUTO: 80.6 % (ref 40–80)
NT-PROBNP SERPL-MCNC: 6121 PG/ML (ref 0–450)
OSMOLALITY SERPL CALC.SUM OF ELEC: 297 MOSM/KG (ref 275–300)
PLATELET # BLD: 195 10X3/UL (ref 130–400)
PMV BLD AUTO: 10.7 FL (ref 7.4–10.4)
POTASSIUM SERPL-SCNC: 3.9 MMOL/L (ref 3.5–5.1)
PROT SERPL-MCNC: 6.8 G/DL (ref 6.4–8.2)
RBC # BLD AUTO: 4.61 10X6/UL (ref 4.2–6.1)
SODIUM SERPL-SCNC: 147 MMOL/L (ref 136–145)
WBC # BLD AUTO: 13.7 10X3/UL (ref 4.8–10.8)

## 2019-01-10 NOTE — NUR
changed pt attends. pt has exforated skin on inside of each thigh. pt tolerated
well and was able to left hips up and help attends be changed. wife at bedside.
bed low locked positon, side rails up times two call light within reach. will
continue to montior for changes.

## 2019-01-10 NOTE — NUR
changed pt attends. pt tolerated well. wiped pt down with baby whip and placed
gown on pt. pt stated he would get too hot. sheet left off per pt request. wife
at bedside. bed low locked postion, side rails up times two call light within
reach. will continue to monitor for changes.

## 2019-01-10 NOTE — NUR
RECIEVED TO FLOOR, 4L O2 IN USE VIA NC. RUSTY ALARM PUT ON BED, PT HAVING
DIFFICULTY BREATHING. SAT HIM UP TO RELIEVE WEIGHT ON HIS CHEST, PT STATES HE
IS BREATHING A LITTLE BETTER. WILL CONTINUE TO MONITOR.

## 2019-01-11 VITALS — DIASTOLIC BLOOD PRESSURE: 85 MMHG | SYSTOLIC BLOOD PRESSURE: 153 MMHG

## 2019-01-11 VITALS — SYSTOLIC BLOOD PRESSURE: 141 MMHG | DIASTOLIC BLOOD PRESSURE: 77 MMHG

## 2019-01-11 VITALS — DIASTOLIC BLOOD PRESSURE: 66 MMHG | SYSTOLIC BLOOD PRESSURE: 120 MMHG

## 2019-01-11 VITALS — DIASTOLIC BLOOD PRESSURE: 86 MMHG | SYSTOLIC BLOOD PRESSURE: 143 MMHG

## 2019-01-11 VITALS — SYSTOLIC BLOOD PRESSURE: 160 MMHG | DIASTOLIC BLOOD PRESSURE: 93 MMHG

## 2019-01-11 LAB
ALBUMIN SERPL-MCNC: 3.1 G/DL (ref 3.4–5)
ALP SERPL-CCNC: 76 U/L (ref 46–116)
ALT SERPL-CCNC: 13 U/L (ref 10–68)
ANION GAP SERPL CALC-SCNC: 14.8 MMOL/L (ref 8–16)
BASOPHILS NFR BLD AUTO: 0.1 % (ref 0–2)
BILIRUB SERPL-MCNC: 0.47 MG/DL (ref 0.2–1.3)
BUN SERPL-MCNC: 24 MG/DL (ref 7–18)
CALCIUM SERPL-MCNC: 9.3 MG/DL (ref 8.5–10.1)
CHLORIDE SERPL-SCNC: 99 MMOL/L (ref 98–107)
CO2 SERPL-SCNC: 34.8 MMOL/L (ref 21–32)
CREAT SERPL-MCNC: 1.1 MG/DL (ref 0.6–1.3)
EOSINOPHIL NFR BLD: 0.3 % (ref 0–7)
ERYTHROCYTE [DISTWIDTH] IN BLOOD BY AUTOMATED COUNT: 12.5 % (ref 11.5–14.5)
GLOBULIN SER-MCNC: 3.5 G/L
GLUCOSE SERPL-MCNC: 156 MG/DL (ref 74–106)
HCT VFR BLD CALC: 47.7 % (ref 42–54)
HGB BLD-MCNC: 15.4 G/DL (ref 13.5–17.5)
IMM GRANULOCYTES NFR BLD: 0.3 % (ref 0–5)
LYMPHOCYTES NFR BLD AUTO: 9 % (ref 15–50)
MCH RBC QN AUTO: 32.6 PG (ref 26–34)
MCHC RBC AUTO-ENTMCNC: 32.3 G/DL (ref 31–37)
MCV RBC: 101.1 FL (ref 80–100)
MONOCYTES NFR BLD: 9.5 % (ref 2–11)
NEUTROPHILS NFR BLD AUTO: 80.8 % (ref 40–80)
OSMOLALITY SERPL CALC.SUM OF ELEC: 295 MOSM/KG (ref 275–300)
PLATELET # BLD: 244 10X3/UL (ref 130–400)
PMV BLD AUTO: 11.6 FL (ref 7.4–10.4)
POTASSIUM SERPL-SCNC: 3.6 MMOL/L (ref 3.5–5.1)
PROT SERPL-MCNC: 6.6 G/DL (ref 6.4–8.2)
RBC # BLD AUTO: 4.72 10X6/UL (ref 4.2–6.1)
SODIUM SERPL-SCNC: 145 MMOL/L (ref 136–145)
WBC # BLD AUTO: 15 10X3/UL (ref 4.8–10.8)

## 2019-01-11 NOTE — NUR
OT NOTE: BED MOB WITH MAX ASSIST; SIT TO STAND WITH MOD ASSIST X 2 WITH RW. PT
VERY CONFUSED. MOD VC FOR FEEDING AND SET UP OF TRAY.
ALFONSO HANSON, OTR/L

## 2019-01-11 NOTE — MORECARE
CASE MANAGEMENT DISCHARGE SUMMARY
 
 
PATIENT: MARLY RADER                      UNIT: [id number]
ACCOUNT#: [id number]                       ADM DATE: 01/10/19
AGE: 78     : 40  SEX: M            ROOM/BED: D.2230    
AUTHOR: BRENDA MELENDEZ                             PHYSICIAN:                               
 
REFERRING PHYSICIAN: KELLI DELACRUZ MD               
DATE OF SERVICE: 19
Discharge Plan
 
 
Patient Name: MARLY RADER
[facility]
Encounter #: [id number]
Medical Record #: [id number]
: 1940
Planned Disposition: [facility] Facility w Plan Readm
Anticipated Discharge Date: 
 
Discharge Date: 
Expected LOS: 
Initial Reviewer: [id number]
Initial Review Date: 2019
Generated: 19   5:29 pm 
 DCPIA - Discharge Planning Initial Assessment
 
Updated by [id number]: Marina Dhaliwal on 19   4:28 pm
*  Is the patient Alert and Oriented?
No
*  How many steps to enter\exit or inside your home? 0/0 *  PCP Nursing home doctor * 
Pharmacy
Nursing Home
*  Preadmission Environment
[facility] [place]
*  Facility Name
[facility]
*  ADLs
Total Dependent
*  Equipment
Nebulizer
Other
Oxygen
Walker
*  Other Equipment
Power wheelchair
*  List name and contact numbers for known caregivers / representatives who 
currently or will assist patient after discharge:
Lily Rader - [facility] - [phone]
 
*  Verbal permission to speak to the caregivers and representatives has been 
obtained from the patient.
Yes
*  Community resources currently utilized
None
*  Additional services required to return to the preadmission environment?
No
*  Can the patient safely return to the preadmission environment?
Yes
*  Has this patient been hospitalized within the prior 30 days at any 
hospital?
No
 
 
 
 
 
 
Patient Name: MARLY RADER
Encounter #: [id number]
Page [id number]
 
 
 
 
 
Electronically Signed by BRENDA MELENDEZ on 19 at 1630
 
 
 
 
 
 
**All edits/amendments must be made on the electronic document**
 
DICTATION DATE: 19     : NANCY  19     
RPT#: [id number]                                DC DATE:        
                                               STATUS: ADM IN  
[facility]
[address] [place] [address]
***END OF REPORT***

## 2019-01-11 NOTE — NUR
ASSESSMENT PER FLOW SHEET. PT IS LYING IN BED WITH SOME SHORTNESS OF BREATH
NOTED. SATS 90-95 ON 7 LTERS PER NASAL CANULA

## 2019-01-11 NOTE — MORECARE
CASE MANAGEMENT DISCHARGE SUMMARY
 
 
PATIENT: MARLY RADER                      UNIT: [id number]
ACCOUNT#: [id number]                       ADM DATE: 01/10/19
AGE: 78     : 40  SEX: M            ROOM/BED: D.2230    
AUTHOR: BRENDA MELENDEZ                             PHYSICIAN:                               
 
REFERRING PHYSICIAN: KELLI DELACRUZ MD               
DATE OF SERVICE: 19
Discharge Plan
 
 
Patient Name: MARLY RADER
Facility: [facility]:[place]
Encounter #: [id number]
Medical Record #: [id number]
: 1940
Planned Disposition: [facility] w Plan Readm
Anticipated Discharge Date: 
 
Discharge Date: 
Expected LOS: 
Initial Reviewer: [email or web address]
Initial Review Date: 2019
Generated: 19   5:38 pm 
Comments
 
DCP- Discharge Planning
 
Updated by [id number]: Marina Dhaliwal on 19   3:34 pm CT
Patient Name: MARLY RADER                                     
Admission Status: ER   
Accout number: [id number]                              
Admission Date: 01-   
: 1940                                                        
Admission Diagnosis:   
Attending: KELLI DELACRUZ                                                
Current LOS:  1   
  
Anticipated DC Date:    
Planned Disposition: [facility] w Plan Readm   
Primary Insurance: MEDICARE A & B   
  
  
Discharge Planning Comments: CM met with patient and his wife to discuss 
discharge planning. Patient is asleep and the wife answers all questions. 
States he lives in long term care at [facility] and the 
plan is to return there. She states he is unable to stand, so he will need 
ambulance transfer. States he is dependent on the nursing staff for his ADL's.
 
 I called [facility] and the DON has left for the day. I will call again on Monday. 
CM will continue to follow and assist with discharge planning/needs.  
  
  
  
  
  
  
: Marina Dhaliwal
 DCPIA - Discharge Planning Initial Assessment
 
Updated by [id number]: Marina Dhaliwal on 19   4:28 pm
*  Is the patient Alert and Oriented?
No
*  How many steps to enter\exit or inside your home? 0/0 *  PCP Nursing home doctor * 
Pharmacy
Nursing Home
*  Preadmission Environment
[facility] Term Nursing [place]
*  Facility Name
[facility]
*  ADLs
Total Dependent
*  Equipment
Nebulizer
Other
Oxygen
Walker
*  Other Equipment
Power wheelchair
*  List name and contact numbers for known caregivers / representatives who 
currently or will assist patient after discharge:
Lily Rader - spouse - [phone]
*  Verbal permission to speak to the caregivers and representatives has been 
obtained from the patient.
Yes
*  Community resources currently utilized
None
*  Additional services required to return to the preadmission environment?
No
*  Can the patient safely return to the preadmission environment?
Yes
*  Has this patient been hospitalized within the prior 30 days at any 
hospital?
No
 
 
 
 
 
 
 
 
Last DP export: 19   3:30 p
Patient Name: MARLY RADER
Encounter #: [id number]
Page [id number]
 
 
 
 
 
Electronically Signed by BRENDA MELENDEZ on 19 at 1638
 
 
 
 
 
 
**All edits/amendments must be made on the electronic document**
 
DICTATION DATE: 19     : NANCY  19     
RPT#: [id number]                                DC DATE:        
                                               STATUS: ADM IN  
[facility]
 [place] [address]
***END OF REPORT***

## 2019-01-11 NOTE — NUR
Rehab Prescreening Consult recieved and the chart has been reviewed.  He is a
[facility] resident and new admission 1/10/19.  Currently he is on 7 liters of 02 and
has been hypertensive.  He also has orders pending.  Unsure at this time if he
is a candidate for the ARU.  Rehab will follow until his workup is complete,
he is medically stable and able to participate with therapy.
Negrita Adames RN
Clinical Liaison, Rehab

## 2019-01-11 NOTE — NUR
PT AAOX2 WHEN ENTERING THE ROOM. WIFE AT BEDSIDE. PT LUNG SOUNDS PRESENT WITH
CRACKLES AND RALES "WET" SOUNDING. PT COMPLAINS OF SHORTNESS OF BREATH
CURRENTLY ON 7L NC. REQUESTS PAIN MEDICINE WITH HS MEDICATIONS.

## 2019-01-12 VITALS — SYSTOLIC BLOOD PRESSURE: 100 MMHG | DIASTOLIC BLOOD PRESSURE: 54 MMHG

## 2019-01-12 VITALS — SYSTOLIC BLOOD PRESSURE: 130 MMHG | DIASTOLIC BLOOD PRESSURE: 79 MMHG

## 2019-01-12 VITALS — DIASTOLIC BLOOD PRESSURE: 67 MMHG | SYSTOLIC BLOOD PRESSURE: 124 MMHG

## 2019-01-12 VITALS — DIASTOLIC BLOOD PRESSURE: 70 MMHG | SYSTOLIC BLOOD PRESSURE: 127 MMHG

## 2019-01-12 VITALS — DIASTOLIC BLOOD PRESSURE: 58 MMHG | SYSTOLIC BLOOD PRESSURE: 107 MMHG

## 2019-01-12 LAB
ALBUMIN SERPL-MCNC: 2.6 G/DL (ref 3.4–5)
ALP SERPL-CCNC: 68 U/L (ref 46–116)
ALT SERPL-CCNC: 8 U/L (ref 10–68)
ANION GAP SERPL CALC-SCNC: 10.7 MMOL/L (ref 8–16)
BASOPHILS NFR BLD AUTO: 0.1 % (ref 0–2)
BILIRUB SERPL-MCNC: 0.3 MG/DL (ref 0.2–1.3)
BUN SERPL-MCNC: 42 MG/DL (ref 7–18)
CALCIUM SERPL-MCNC: 9.5 MG/DL (ref 8.5–10.1)
CHLORIDE SERPL-SCNC: 96 MMOL/L (ref 98–107)
CO2 SERPL-SCNC: 38.4 MMOL/L (ref 21–32)
CREAT SERPL-MCNC: 1.4 MG/DL (ref 0.6–1.3)
EOSINOPHIL NFR BLD: 0 % (ref 0–7)
ERYTHROCYTE [DISTWIDTH] IN BLOOD BY AUTOMATED COUNT: 12.5 % (ref 11.5–14.5)
GLOBULIN SER-MCNC: 4.1 G/L
GLUCOSE SERPL-MCNC: 198 MG/DL (ref 74–106)
HCT VFR BLD CALC: 44.4 % (ref 42–54)
HGB BLD-MCNC: 14.4 G/DL (ref 13.5–17.5)
IMM GRANULOCYTES NFR BLD: 0.4 % (ref 0–5)
LYMPHOCYTES NFR BLD AUTO: 8.4 % (ref 15–50)
MAGNESIUM SERPL-MCNC: 2.2 MG/DL (ref 1.8–2.4)
MCH RBC QN AUTO: 32.5 PG (ref 26–34)
MCHC RBC AUTO-ENTMCNC: 32.4 G/DL (ref 31–37)
MCV RBC: 100.2 FL (ref 80–100)
MONOCYTES NFR BLD: 5.6 % (ref 2–11)
NEUTROPHILS NFR BLD AUTO: 85.5 % (ref 40–80)
OSMOLALITY SERPL CALC.SUM OF ELEC: 299 MOSM/KG (ref 275–300)
PHOSPHATE SERPL-MCNC: 4.7 MG/DL (ref 2.5–4.9)
PLATELET # BLD: 236 10X3/UL (ref 130–400)
PMV BLD AUTO: 11.7 FL (ref 7.4–10.4)
POTASSIUM SERPL-SCNC: 3.1 MMOL/L (ref 3.5–5.1)
PROT SERPL-MCNC: 6.7 G/DL (ref 6.4–8.2)
RBC # BLD AUTO: 4.43 10X6/UL (ref 4.2–6.1)
SODIUM SERPL-SCNC: 142 MMOL/L (ref 136–145)
WBC # BLD AUTO: 12.2 10X3/UL (ref 4.8–10.8)

## 2019-01-12 NOTE — NUR
PT IN BED RESTING QUIETLY WITH EYES CLOSED. ALERT AND ORIENTED X4.
RESPIRATIONS EVEN AND UNLABORED. VS STABLE AND AFEBRILE. NO VISUAL CUES OF
DISTRESS NOTED. DENIES ANY OTHER NEEDS AT THIS TIME. BED LOW, SIDE RAILS UP
X2. CALL LIGHT IN REACH. WILL CONTINUE TO MONITOR.

## 2019-01-12 NOTE — NUR
EYES CLOSED, EVEN UNLABORED BREATHING, 02 PRESENT VIA NC AT 7LPM, SCDS IN
OPERATION AND ON, TELEMETRY, EASILY AROUSED BY VOICE, DENIES ANY NEEDS OR
DISCOMFORTS, BED LOWERED AND LOCKED, FAMILY MEMBER PRESENT IN ROOM, CALL LIGHT
WITHIN REACH. CPOC

## 2019-01-13 VITALS — DIASTOLIC BLOOD PRESSURE: 65 MMHG | SYSTOLIC BLOOD PRESSURE: 109 MMHG

## 2019-01-13 VITALS — DIASTOLIC BLOOD PRESSURE: 58 MMHG | SYSTOLIC BLOOD PRESSURE: 126 MMHG

## 2019-01-13 VITALS — SYSTOLIC BLOOD PRESSURE: 125 MMHG | DIASTOLIC BLOOD PRESSURE: 66 MMHG

## 2019-01-13 VITALS — SYSTOLIC BLOOD PRESSURE: 135 MMHG | DIASTOLIC BLOOD PRESSURE: 70 MMHG

## 2019-01-13 VITALS — DIASTOLIC BLOOD PRESSURE: 76 MMHG | SYSTOLIC BLOOD PRESSURE: 133 MMHG

## 2019-01-13 LAB
ALBUMIN SERPL-MCNC: 2.4 G/DL (ref 3.4–5)
ALP SERPL-CCNC: 68 U/L (ref 46–116)
ALT SERPL-CCNC: 7 U/L (ref 10–68)
ANION GAP SERPL CALC-SCNC: 10.3 MMOL/L (ref 8–16)
BASOPHILS NFR BLD AUTO: 0.2 % (ref 0–2)
BILIRUB SERPL-MCNC: 0.16 MG/DL (ref 0.2–1.3)
BUN SERPL-MCNC: 66 MG/DL (ref 7–18)
CALCIUM SERPL-MCNC: 10 MG/DL (ref 8.5–10.1)
CHLORIDE SERPL-SCNC: 96 MMOL/L (ref 98–107)
CO2 SERPL-SCNC: 37.3 MMOL/L (ref 21–32)
CREAT SERPL-MCNC: 1.6 MG/DL (ref 0.6–1.3)
EOSINOPHIL NFR BLD: 0 % (ref 0–7)
ERYTHROCYTE [DISTWIDTH] IN BLOOD BY AUTOMATED COUNT: 12.5 % (ref 11.5–14.5)
GLOBULIN SER-MCNC: 4.1 G/L
GLUCOSE SERPL-MCNC: 207 MG/DL (ref 74–106)
HCT VFR BLD CALC: 56.3 % (ref 42–54)
HGB BLD-MCNC: 18.6 G/DL (ref 13.5–17.5)
IMM GRANULOCYTES NFR BLD: 0.3 % (ref 0–5)
LYMPHOCYTES NFR BLD AUTO: 10 % (ref 15–50)
MCH RBC QN AUTO: 32.9 PG (ref 26–34)
MCHC RBC AUTO-ENTMCNC: 33 G/DL (ref 31–37)
MCV RBC: 99.6 FL (ref 80–100)
MONOCYTES NFR BLD: 5.6 % (ref 2–11)
NEUTROPHILS NFR BLD AUTO: 83.9 % (ref 40–80)
OSMOLALITY SERPL CALC.SUM OF ELEC: 303 MOSM/KG (ref 275–300)
PLATELET # BLD: 164 10X3/UL (ref 130–400)
PMV BLD AUTO: 11.9 FL (ref 7.4–10.4)
POTASSIUM SERPL-SCNC: 3.6 MMOL/L (ref 3.5–5.1)
PROT SERPL-MCNC: 6.5 G/DL (ref 6.4–8.2)
RBC # BLD AUTO: 5.65 10X6/UL (ref 4.2–6.1)
SODIUM SERPL-SCNC: 140 MMOL/L (ref 136–145)
WBC # BLD AUTO: 12.3 10X3/UL (ref 4.8–10.8)

## 2019-01-13 NOTE — NUR
ALERT AND ORIENTED TO SELF AND FAMILIAR FACES. LUNGS CTA BUT DIMINISHED X4.
hI-FLOW N/C INFUSING AT 9L/MIN. FALL RISK PRECAUTIONS IN PLACE. SCD INTACT
WITH PERIPHERAL PULSED NOTED X4. NO PERIPHERAL EDEMA NOTED. WIFE PRENSENT AND
ENCOURAGED TO USE CALL LIGHT FOR ASSIST.

## 2019-01-14 VITALS — DIASTOLIC BLOOD PRESSURE: 66 MMHG | SYSTOLIC BLOOD PRESSURE: 125 MMHG

## 2019-01-14 VITALS — DIASTOLIC BLOOD PRESSURE: 60 MMHG | SYSTOLIC BLOOD PRESSURE: 154 MMHG

## 2019-01-14 VITALS — SYSTOLIC BLOOD PRESSURE: 110 MMHG | DIASTOLIC BLOOD PRESSURE: 52 MMHG

## 2019-01-14 VITALS — DIASTOLIC BLOOD PRESSURE: 65 MMHG | SYSTOLIC BLOOD PRESSURE: 121 MMHG

## 2019-01-14 LAB
ALBUMIN SERPL-MCNC: 2.4 G/DL (ref 3.4–5)
ALP SERPL-CCNC: 62 U/L (ref 46–116)
ALT SERPL-CCNC: 13 U/L (ref 10–68)
ANION GAP SERPL CALC-SCNC: 9.2 MMOL/L (ref 8–16)
BASOPHILS NFR BLD AUTO: 0.1 % (ref 0–2)
BILIRUB SERPL-MCNC: 0.19 MG/DL (ref 0.2–1.3)
BUN SERPL-MCNC: 79 MG/DL (ref 7–18)
CALCIUM SERPL-MCNC: 9.5 MG/DL (ref 8.5–10.1)
CHLORIDE SERPL-SCNC: 95 MMOL/L (ref 98–107)
CO2 SERPL-SCNC: 39.2 MMOL/L (ref 21–32)
CREAT SERPL-MCNC: 1.6 MG/DL (ref 0.6–1.3)
EOSINOPHIL NFR BLD: 0 % (ref 0–7)
ERYTHROCYTE [DISTWIDTH] IN BLOOD BY AUTOMATED COUNT: 12.2 % (ref 11.5–14.5)
GLOBULIN SER-MCNC: 4 G/L
GLUCOSE SERPL-MCNC: 233 MG/DL (ref 74–106)
HCT VFR BLD CALC: 44.6 % (ref 42–54)
HGB BLD-MCNC: 14.5 G/DL (ref 13.5–17.5)
IMM GRANULOCYTES NFR BLD: 0.4 % (ref 0–5)
LYMPHOCYTES NFR BLD AUTO: 5.8 % (ref 15–50)
MCH RBC QN AUTO: 32.3 PG (ref 26–34)
MCHC RBC AUTO-ENTMCNC: 32.5 G/DL (ref 31–37)
MCV RBC: 99.3 FL (ref 80–100)
MONOCYTES NFR BLD: 2.6 % (ref 2–11)
NEUTROPHILS NFR BLD AUTO: 91.1 % (ref 40–80)
OSMOLALITY SERPL CALC.SUM OF ELEC: 309 MOSM/KG (ref 275–300)
PLATELET # BLD: 300 10X3/UL (ref 130–400)
PMV BLD AUTO: 11.5 FL (ref 7.4–10.4)
POTASSIUM SERPL-SCNC: 3.4 MMOL/L (ref 3.5–5.1)
PROT SERPL-MCNC: 6.4 G/DL (ref 6.4–8.2)
RBC # BLD AUTO: 4.49 10X6/UL (ref 4.2–6.1)
SODIUM SERPL-SCNC: 140 MMOL/L (ref 136–145)
WBC # BLD AUTO: 11.7 10X3/UL (ref 4.8–10.8)

## 2019-01-14 NOTE — NUR
ALERT AND ORIENTED TO SELF AND FAMILIAR FACES. IVF INFUSING AT PRESCRIBED RATE
TELEMETRY INTACT WITHOUT ANY CHEST PAIN OR DISCOMFORT. FAILY AT BEDSIDE.
RESP. EVEN AND UNLABORED AND CTA. ENCOURAGED PT. AND WIFE TO USE CALL LIGHT
FOR ASSIST.

## 2019-01-14 NOTE — NUR
DECREASED MOBILITY:
RECOMMENDATIONS:
ASSIST/REMIND PT TO TURN/REPOSITION Q 2 HOURS WHILE IN BED
PERICARE AS NEEDED DUE TO INCONTINENCE
USE CALMOSEPTINE CREAM FOR ANY REDNESS RELATED TO INCONINTENCE

## 2019-01-14 NOTE — NUR
A/OX4, HOWEVER PT SEEMS TO BE COGNATIVELY SLOW AND HIS SPEACH IS SLURRED AT
TIMES. WIFE AT BEDSIDE. ASKED FOR SOMTHING FOR SLEEP, BUT WHEN I WENT IN HIS
ROOM TO TELL HIM NOTHING WAS ORDERED, HE WAS SLEEPING. DID NOT WAKE. DENIES
NEEDS OTHER THAN SLEEP PROBLEMS AND C/O HAVING THE SENSATION OF SOMETHING IN
HIS THROAT. PT C/O NOT BEING ABLE TO BREATH, ORIENTED PT TO THE FACT IF HE IS
TALKING HE IS BREATHING. WILL CONTINUE POC.

## 2019-01-14 NOTE — NUR
PATIENT AND WIFE NOTIFIED OF PATIENTS LAB BEING RECHECKED IN AM FOR POTASSIUM,
VERBALIZED UNDERSTANDING. DENIES ANY C/O WHEN ASKED. C/L WITHIN REACH AND SR'S
UP X'S 2, AND BED IN LOWEST POSITION.

## 2019-01-15 VITALS — DIASTOLIC BLOOD PRESSURE: 54 MMHG | SYSTOLIC BLOOD PRESSURE: 134 MMHG

## 2019-01-15 VITALS — SYSTOLIC BLOOD PRESSURE: 98 MMHG | DIASTOLIC BLOOD PRESSURE: 56 MMHG

## 2019-01-15 VITALS — SYSTOLIC BLOOD PRESSURE: 139 MMHG | DIASTOLIC BLOOD PRESSURE: 74 MMHG

## 2019-01-15 VITALS — DIASTOLIC BLOOD PRESSURE: 71 MMHG | SYSTOLIC BLOOD PRESSURE: 134 MMHG

## 2019-01-15 LAB
ALBUMIN SERPL-MCNC: 2.2 G/DL (ref 3.4–5)
ALP SERPL-CCNC: 59 U/L (ref 46–116)
ALT SERPL-CCNC: 17 U/L (ref 10–68)
ANION GAP SERPL CALC-SCNC: 5.9 MMOL/L (ref 8–16)
BASOPHILS NFR BLD AUTO: 0.1 % (ref 0–2)
BILIRUB SERPL-MCNC: 0.16 MG/DL (ref 0.2–1.3)
BUN SERPL-MCNC: 88 MG/DL (ref 7–18)
CALCIUM SERPL-MCNC: 9.2 MG/DL (ref 8.5–10.1)
CHLORIDE SERPL-SCNC: 97 MMOL/L (ref 98–107)
CO2 SERPL-SCNC: 39.6 MMOL/L (ref 21–32)
CREAT SERPL-MCNC: 1.6 MG/DL (ref 0.6–1.3)
EOSINOPHIL NFR BLD: 0 % (ref 0–7)
ERYTHROCYTE [DISTWIDTH] IN BLOOD BY AUTOMATED COUNT: 12.1 % (ref 11.5–14.5)
GLOBULIN SER-MCNC: 3.7 G/L
GLUCOSE SERPL-MCNC: 186 MG/DL (ref 74–106)
HCT VFR BLD CALC: 43.4 % (ref 42–54)
HGB BLD-MCNC: 14.7 G/DL (ref 13.5–17.5)
IMM GRANULOCYTES NFR BLD: 0.4 % (ref 0–5)
LYMPHOCYTES NFR BLD AUTO: 9.1 % (ref 15–50)
MCH RBC QN AUTO: 32.7 PG (ref 26–34)
MCHC RBC AUTO-ENTMCNC: 33.9 G/DL (ref 31–37)
MCV RBC: 96.7 FL (ref 80–100)
MONOCYTES NFR BLD: 8.5 % (ref 2–11)
NEUTROPHILS NFR BLD AUTO: 81.9 % (ref 40–80)
OSMOLALITY SERPL CALC.SUM OF ELEC: 309 MOSM/KG (ref 275–300)
PLATELET # BLD: 254 10X3/UL (ref 130–400)
PMV BLD AUTO: 11.4 FL (ref 7.4–10.4)
POTASSIUM SERPL-SCNC: 3.5 MMOL/L (ref 3.5–5.1)
PROT SERPL-MCNC: 5.9 G/DL (ref 6.4–8.2)
RBC # BLD AUTO: 4.49 10X6/UL (ref 4.2–6.1)
SODIUM SERPL-SCNC: 139 MMOL/L (ref 136–145)
WBC # BLD AUTO: 13.6 10X3/UL (ref 4.8–10.8)

## 2019-01-15 NOTE — EC
PATIENT:MARLY RADER                  DATE OF SERVICE: 01/10/19
SEX: M                                  MEDICAL RECORD: [id number]
DATE OF BIRTH: [birth year]                        LOCATION:D.[facility]
AGE OF PATIENT: 78                             ADMISSION DATE: 01/10/19
 
REFERRING PHYSICIAN:                               
 
INTERPRETING PHYSICIAN: PINKY PANG MD          
 
 
 
                             ECHOCARDIOGRAM REPORT
  ECHO CHARGES 4               ECHO COMPLETE                 Date: 01/11/19
 
 
 
CLINICAL DIAGNOSIS: CHF                           
 
                         ECHOCARDIOGRAPHIC MEASUREMENTS
      (adult normal given)
   AC root (d.<3.7cm) 4.0  cm   LV Septum d (<1.2 cm> 1.6  cm
      Valve Excursion 2.4  cm     LV Septum (systole) 2.3  cm
Left Atria (s.<4.0cm> 3.8  cm          LVPW d(<1.2cm) 1.8  cm
        RV (d.<2.3cm) 2.3  cm           LVPW (sytole) 2.1  cm
  LV diastole(<5.6CM) 4.9  cm       MV E-F(>70mm/sec)      cm
           LV systole 3.2  cm           LVOT Diameter 2.0  cm
       MV exc.(>10mm)      cm
Est.ejection fraction (50-75%)     %
 
   DOPPLER:
     LVIT      cm/sec A 127  cm/sec E 52.0  cm/sec
       LA      cm/sec      RVSP      mmHg
     LVOT 143  cm/sec   AOP1/2T      m/s
  Asc. Ao 140  cm/sec
     RVOT 115  cm/sec
       RA      cm/sec
         cm/sec
 AV Gradient Peak 7.8  mmHg  AV Mean 3.9  mmHg  AV Area 2.9  cm
 MV Gradient Peak 4.4  mmHg  MV Mean 1.6  mmHg  MV Area      cm
   COMMENTS:                                              
 
 
 Cardiac Sonographer: 1               MARI MENARDOE            
      Cardiologist: 3          Dr. Diggs             
             TAPE# PACS           
                                       Pericardial Effusion Y                        
 
 
DATE OF SERVICE:  01/12/2019
 
Adequate 2D echo, color flow and spectral Doppler, and M-mode.
 
LVH is present.  LV internal dimensions are normal.  LV function appears to be
mildly reduced.  Overall LV function estimated at 40% to 45%.  Aortic valve
sclerosis is without stenosis by Doppler interrogation.  Left atrium is normal
at 3.8 cm.  Mitral valve shows no prolapse.  Trace MR.  Right-sided chamber is
grossly normal.  Mild TR.
 
 
 
 
ECHOCARDIOGRAM REPORT                          [id number]    MARLY RADER          
 
 
TRANSINT:[id number] Voice Confirmation ID: [id number] DOCUMENT ID: [id number]
                                           
                                           PINKY PANG MD          
 
 
 
Electronically Signed by IPNKY PANG on 01/15/19 at 1501
 
 
 
 
 
 
 
 
 
 
 
 
 
 
 
 
 
 
 
 
 
 
 
 
 
 
 
 
 
 
 
 
 
 
 
 
 
 
 
CC:                                                             [id number]
DICTATION DATE: 01/12/19 1149     :     01/12/19 1330      ADM IN  
                                                                              
[facility]                                          
[address] [id number]

## 2019-01-15 NOTE — NUR
OT NOTE: PT RESISTANT ON GETTING UP INTO CHAIR BUT FINALLY ENCOURAGED
EXPLAINING THE IMPORTANCE OF SITTING UP FOR STRENGTH AND LUNG FUNCTION. SUPINE
TO SIT WITH MOD ASSIST; STATIC SITTING ON EDGE OF BED WITH CGA; REQUIRED 3
ATTEMPTS IN ORDER TO TRANSFER TO CHAIR (MAX ASSIST) WITH USE OF RW. PERFORMED
FEEDING WITH SET UP AND VC; MIN ASSIST WITH SIMPLE GROOMING; MAX ASSIST WITH
DOFFING AND DONNING SOCKS AND GOWN. PT AGREEABLE TO SIT UP IN CHAIR THROUGH
LUNCH. WIFE AT BEDSIDE.
ALFONSO HANSON, OTR/L

## 2019-01-15 NOTE — NUR
AWAKE,ALERT.NO COMPLAINTS VOICED. SKIN WARMD DRY. RESP EVEN AND UNALBORED. NO
DISTRESS NOTED. O2 @2L PER NC ON. SL TO RIGHT THUMB INTACT WITHOUT REDNESS OR
EDEMA NOTHED.SCD ON. RUSTY WILLIAM ON. WIFE AT BEDSIDE. CL IN REACH

## 2019-01-15 NOTE — NUR
PT AOX1 SELF AT THIS TIME PT DENIES NEEDS AT THIS TIME SRX2 BED AT LOWEST
SETTING CALL LIGHT WITHIN REACH WILL CONTINUE TO MONITOR WILL CONTINUE TO
MONITOR

## 2019-01-16 VITALS — DIASTOLIC BLOOD PRESSURE: 59 MMHG | SYSTOLIC BLOOD PRESSURE: 122 MMHG

## 2019-01-16 VITALS — DIASTOLIC BLOOD PRESSURE: 59 MMHG | SYSTOLIC BLOOD PRESSURE: 130 MMHG

## 2019-01-16 VITALS — SYSTOLIC BLOOD PRESSURE: 119 MMHG | DIASTOLIC BLOOD PRESSURE: 52 MMHG

## 2019-01-16 VITALS — SYSTOLIC BLOOD PRESSURE: 124 MMHG | DIASTOLIC BLOOD PRESSURE: 74 MMHG

## 2019-01-16 VITALS — DIASTOLIC BLOOD PRESSURE: 69 MMHG | SYSTOLIC BLOOD PRESSURE: 127 MMHG

## 2019-01-16 LAB
ANION GAP SERPL CALC-SCNC: 8.4 MMOL/L (ref 8–16)
APPEARANCE UR: CLEAR
BASOPHILS NFR BLD AUTO: 0.1 % (ref 0–2)
BILIRUB SERPL-MCNC: NEGATIVE MG/DL
BUN SERPL-MCNC: 88 MG/DL (ref 7–18)
CALCIUM SERPL-MCNC: 9.2 MG/DL (ref 8.5–10.1)
CHLORIDE SERPL-SCNC: 96 MMOL/L (ref 98–107)
CO2 SERPL-SCNC: 38.4 MMOL/L (ref 21–32)
COLOR UR: YELLOW
CREAT SERPL-MCNC: 1.5 MG/DL (ref 0.6–1.3)
EOSINOPHIL NFR BLD: 0 % (ref 0–7)
ERYTHROCYTE [DISTWIDTH] IN BLOOD BY AUTOMATED COUNT: 12.3 % (ref 11.5–14.5)
GLUCOSE SERPL-MCNC: 211 MG/DL (ref 74–106)
GLUCOSE SERPL-MCNC: NEGATIVE MG/DL
HCT VFR BLD CALC: 44.2 % (ref 42–54)
HGB BLD-MCNC: 14.5 G/DL (ref 13.5–17.5)
IMM GRANULOCYTES NFR BLD: 0.6 % (ref 0–5)
KETONES UR STRIP-MCNC: NEGATIVE MG/DL
LYMPHOCYTES NFR BLD AUTO: 7.2 % (ref 15–50)
MCH RBC QN AUTO: 32 PG (ref 26–34)
MCHC RBC AUTO-ENTMCNC: 32.8 G/DL (ref 31–37)
MCV RBC: 97.6 FL (ref 80–100)
MONOCYTES NFR BLD: 4.7 % (ref 2–11)
NEUTROPHILS NFR BLD AUTO: 87.4 % (ref 40–80)
NITRITE UR-MCNC: NEGATIVE MG/ML
OSMOLALITY SERPL CALC.SUM OF ELEC: 310 MOSM/KG (ref 275–300)
PH UR STRIP: 6 [PH] (ref 5–6)
PLATELET # BLD: 261 10X3/UL (ref 130–400)
PMV BLD AUTO: 11.5 FL (ref 7.4–10.4)
POTASSIUM SERPL-SCNC: 3.8 MMOL/L (ref 3.5–5.1)
PROT UR-MCNC: NEGATIVE MG/DL
RBC # BLD AUTO: 4.53 10X6/UL (ref 4.2–6.1)
SODIUM SERPL-SCNC: 139 MMOL/L (ref 136–145)
SP GR UR STRIP: 1.01 (ref 1–1.02)
UROBILINOGEN UR-MCNC: NORMAL MG/DL
WBC # BLD AUTO: 11.3 10X3/UL (ref 4.8–10.8)

## 2019-01-16 NOTE — CN
PATIENT NAME:MARLY RADER                                MEDICAL RECORD: [id number]
: 40                                              LOCATION:D.MS [id number]
ADMIT DATE: 01/10/19                                       ACCOUNT: [id number]
CONSULTING PHYSICIAN:    MARILOU ARTIS MD              
                                               
REFERRING PHYSICIAN:     KELLI DELACRUZ MD               
 
 
DATE OF CONSULTATION:  2019
 
CONSULT REQUESTING PHYSICIAN:  Katerin Barksdale MD
 
REASON FOR CONSULTATION:  Pneumonia, acute exacerbation of chronic obstructive
pulmonary disease.
 
HISTORY OF PRESENT ILLNESS:  Mr. Rader is a 78-year-old gentleman, very well
known to our service.  The patient was brought in from the nursing home with
worsening shortness of breath.  Workup showed the patient has pneumonia as well
as pulmonary edema and CHF.  The patient denies any fever or chill, no night
sweats.  Denies any aspiration.
 
REVIEW OF SYSTEMS:  As in history of present illness.
 
PAST MEDICAL HISTORY:
1. Chronic hypoxic respiratory failure.
2. Chronic obstructive pulmonary disease.
3. Emphysema.
4. Coronary artery disease.
5. Hypertension.
6. Diabetes mellitus.
7. Neuropathy.
8. Anxiety, depression.
9. Generalized weakness.
 
ALLERGIES:  HE IS ALLERGIC TO PENICILLIN.
 
MEDICATIONS:  On Meditech is reviewed.
 
PAST SURGICAL HISTORY:
1. Back surgery.
2. He has a history of craniotomy.
3. Prostatectomy.
4. Left leg surgery.
5. Cardiac catheterization and stent placement.
 
ALLERGIES:  ALLERGIC TO PENICILLIN.
 
MEDICATIONS:  Meditech is reviewed.
 
PERSONAL AND SOCIAL HISTORY:  The patient is .  He is now a nursing home
resident.
 
FAMILY HISTORY:  Significant for cardiovascular disease.
 
PHYSICAL EXAMINATION:
GENERAL:  Now, the patient is lying comfortably.  He is not in acute distress.
VITAL SIGNS:  The blood pressure is 143/86, pulse is 95, respiration is 21,
temperature 98.2, SPO2 is 92% on 7 liters nasal cannula.
 
 
 
CONSULT REPORT                                 [id number]    MARLY RADER              
 
 
HEENT:  Conjunctivae are pink.  Sclerae are not icteric.
NECK:  Supple, no JVD.
CHEST:  The chest excursion minimal, but there are crackles, right lower lobe.
 
OTHER LABORATORY DATA:  CBC:  WBC is 15,000, hemoglobin is 15.4, hematocrit is
47.7, the platelet count is 244.  Chemistry:  Sodium 145, potassium 3.6, BUN is
24, creatinine is 1.1.
 
IMPRESSION:
1. Acute-on-chronic hypoxic respiratory failure.
2. Pneumonia, right lower lobe.
3. Acute exacerbation of chronic obstructive pulmonary disease.
4. Dyspnea.
5. Leukocytosis.
6. Congestive heart failure with chronic diastolic dysfunction.
 
RECOMMENDATION:
1. We will continue supplemental oxygen.
2. Lasix IV.
3. Discontinue Zithromax, start on Levaquin IV, Rocephin IV.
4. Brovana, budesonide nebulizer.
5. Methylprednisolone IV.
6. Albuterol ipratropium nebulizer.
7. Follow up labs and chest radiograph.
8. DVT prophylaxis.
9. We will follow up on the cardiac echo.
 
Dr. Barksdale, thank you for involving me in the care of Mr. Rader.
 
TRANSINT:[id number] Voice Confirmation ID: [id number] DOCUMENT ID: [id number]
                                           
                                           MARILOU ARTIS MD              
 
 
 
Electronically Signed by MARILOU ARTIS on 19 at 1642
 
 
 
 
 
 
 
 
 
 
 
CC:                                                             [id number]
DICTATION DATE: 19     :     19 0116      ADM IN  
                                                                              
[id number] [id number]

## 2019-01-16 NOTE — NUR
OT NOTE: BED MOB WITH MOD ASSIST; SIT TO STAND WITH MOD ASSIST X 2 X 3 TRIALS;
ASSIST WITH URINAL USAGE; ABLE TO HOLD DRINK WITHOUT ASSIST; STANDING IMPROVED
FROM YESTERDAY.
ALFONSO HANSON, OTR/L

## 2019-01-16 NOTE — NUR
OT NOTE: PT COMPLETED HYGIENE TASKS WITH MIN A. PT COMPLETED BED MOB WITH
MIN/MOD A.
THANK YOU, GERMAN TURNER

## 2019-01-16 NOTE — NUR
NUTRITION F/U
CHART REVIEWED, PT VISIT. EATING LUNCH, GOOD INTAKE  RECENT MEALS. WILL
CONTINUE TO PROVIDE DIET, MONITOR INTAKE.
RD FOLLOWING

## 2019-01-17 VITALS — DIASTOLIC BLOOD PRESSURE: 57 MMHG | SYSTOLIC BLOOD PRESSURE: 107 MMHG

## 2019-01-17 VITALS — DIASTOLIC BLOOD PRESSURE: 60 MMHG | SYSTOLIC BLOOD PRESSURE: 124 MMHG

## 2019-01-17 VITALS — DIASTOLIC BLOOD PRESSURE: 68 MMHG | SYSTOLIC BLOOD PRESSURE: 110 MMHG

## 2019-01-17 VITALS — SYSTOLIC BLOOD PRESSURE: 101 MMHG | DIASTOLIC BLOOD PRESSURE: 65 MMHG

## 2019-01-17 VITALS — SYSTOLIC BLOOD PRESSURE: 128 MMHG | DIASTOLIC BLOOD PRESSURE: 67 MMHG

## 2019-01-17 VITALS — DIASTOLIC BLOOD PRESSURE: 69 MMHG | SYSTOLIC BLOOD PRESSURE: 129 MMHG

## 2019-01-17 VITALS — SYSTOLIC BLOOD PRESSURE: 98 MMHG | DIASTOLIC BLOOD PRESSURE: 58 MMHG

## 2019-01-17 LAB
ANION GAP SERPL CALC-SCNC: 7.5 MMOL/L (ref 8–16)
BASOPHILS NFR BLD AUTO: 0.1 % (ref 0–2)
BUN SERPL-MCNC: 85 MG/DL (ref 7–18)
CALCIUM SERPL-MCNC: 9.6 MG/DL (ref 8.5–10.1)
CHLORIDE SERPL-SCNC: 98 MMOL/L (ref 98–107)
CO2 SERPL-SCNC: 39.2 MMOL/L (ref 21–32)
CREAT SERPL-MCNC: 1.4 MG/DL (ref 0.6–1.3)
EOSINOPHIL NFR BLD: 0.3 % (ref 0–7)
ERYTHROCYTE [DISTWIDTH] IN BLOOD BY AUTOMATED COUNT: 12.3 % (ref 11.5–14.5)
GLUCOSE SERPL-MCNC: 137 MG/DL (ref 74–106)
HCT VFR BLD CALC: 46.7 % (ref 42–54)
HGB BLD-MCNC: 15.5 G/DL (ref 13.5–17.5)
IMM GRANULOCYTES NFR BLD: 0.6 % (ref 0–5)
LYMPHOCYTES NFR BLD AUTO: 23.6 % (ref 15–50)
MCH RBC QN AUTO: 32.4 PG (ref 26–34)
MCHC RBC AUTO-ENTMCNC: 33.2 G/DL (ref 31–37)
MCV RBC: 97.5 FL (ref 80–100)
MONOCYTES NFR BLD: 7.8 % (ref 2–11)
NEUTROPHILS NFR BLD AUTO: 67.6 % (ref 40–80)
OSMOLALITY SERPL CALC.SUM OF ELEC: 308 MOSM/KG (ref 275–300)
PLATELET # BLD: 240 10X3/UL (ref 130–400)
PMV BLD AUTO: 11.8 FL (ref 7.4–10.4)
POTASSIUM SERPL-SCNC: 3.7 MMOL/L (ref 3.5–5.1)
RBC # BLD AUTO: 4.79 10X6/UL (ref 4.2–6.1)
SODIUM SERPL-SCNC: 141 MMOL/L (ref 136–145)
WBC # BLD AUTO: 12.6 10X3/UL (ref 4.8–10.8)

## 2019-01-17 NOTE — NUR
PT RESTING IN BED, EYES OPEN. NO C/O PAIN. NO S/S OF ACUTE DISTRESS NOTED. PT
ON ELECTROLYTE PROTOCOL. PT ACHS FSBS. PT ON 6L O2, NC. IV TO RIGHT THUMB,
SITE PATENT WITHOUT REDNESS OR SWELLING, SL. PT ON TELEMETRY 62 BBB. WIFE AT
BEDSIDE. PT DENIES ANYTHING FURTHER AT THIS TIME. CALL LIGHT IN REACH. WILL
CONTINUE TO MONITOR.

## 2019-01-17 NOTE — NUR
PT RESTING IN BED, EYES OPEN. NO C/O PAIN. NO S/S OF DISTRESS NOTED. WIFE AT
BEDSIDE. PT DENIES ANYTHING FURTHER AT THIS TIME. CALL LIGHT IN REACH. FALL
PRECAUTIONS IN PLACE. WILL CONTINUE TO MONITOR.

## 2019-01-17 NOTE — NUR
PT FSBS 425, GAVE 20 UNITS OF INSULIN. NOTIFIED ISAURA STAUFFER. PT C/O
GENERALIZED PAIN. NOTIFIED APN ALSO. NO S/S OF DISTRESS NOTED. WIFE AT
BEDISDE. FALL PRECAUTIONS IN PLACE. CALL LIGHT IN REACH. WILL CONTINUE TO
MONITOR.

## 2019-01-17 NOTE — NUR
OT NOTE: PT COMPLETED UE AROM. PT COMPLETED BED MOB WITH MIN/MOD. PT COMPLETED
FACE WASHING WITH MIN A.
THANK YOU, GERMAN TURNER

## 2019-01-17 NOTE — NUR
OT NOTE:  PERFORMED BED MOB IN AM; SIMPLE GROOMING WITH MIN ASSIST; FEEDING A
FEW BITES WITHOUT ASSIST; TRANSFER WITH MAX ASSIST AND USE OF RW; PT SEEN IN
PM; TRANSFERRED BACK TO BED WITH RW AND MAX ASSIST; STATIC SITTING WITH GOOD
BALANCE; ABLE TO GET FEET IN BED WITHOUT ASSIST; DONNED/DOFFED GOWN WITH MIN
ASSIST.
ALFONSO HANSON, OTR/L

## 2019-01-17 NOTE — NUR
PERIODS OF CONFUSION. CALLS OUT THE ROOM FOR HELP. RE-ORIENTED PT THAT WE DO
NOT YELL OUT THE ROOM. REASURED LPN WAS EN ROUTE WITH MEDS. BREATHING SHALLOW.
DENIES OTHER NEEDS. WIFE AT BEDSIDE. WILL CONTINUE POC.

## 2019-01-17 NOTE — NUR
ALERT AND ORIENTED X4. 5 L PER NC. TELEMETRY SINUS CARLA. RIGHT FOREARM PATENT
AND SALINE LOCKED. FAMILY IS AT BEDSIDE. FALL PERCAUTIONS MAINTAINED. BED LOW
CALL LIGHT IN REACH AND PATIENT DENIES ANY NEEDS AT THIS TIME

## 2019-01-18 VITALS — DIASTOLIC BLOOD PRESSURE: 58 MMHG | SYSTOLIC BLOOD PRESSURE: 118 MMHG

## 2019-01-18 VITALS — SYSTOLIC BLOOD PRESSURE: 113 MMHG | DIASTOLIC BLOOD PRESSURE: 57 MMHG

## 2019-01-18 VITALS — DIASTOLIC BLOOD PRESSURE: 80 MMHG | SYSTOLIC BLOOD PRESSURE: 115 MMHG

## 2019-01-18 VITALS — DIASTOLIC BLOOD PRESSURE: 51 MMHG | SYSTOLIC BLOOD PRESSURE: 95 MMHG

## 2019-01-18 VITALS — DIASTOLIC BLOOD PRESSURE: 93 MMHG | SYSTOLIC BLOOD PRESSURE: 119 MMHG

## 2019-01-18 LAB
ANION GAP SERPL CALC-SCNC: 8.7 MMOL/L (ref 8–16)
BASOPHILS NFR BLD AUTO: 0.1 % (ref 0–2)
BUN SERPL-MCNC: 82 MG/DL (ref 7–18)
CALCIUM SERPL-MCNC: 9 MG/DL (ref 8.5–10.1)
CHLORIDE SERPL-SCNC: 97 MMOL/L (ref 98–107)
CO2 SERPL-SCNC: 38.8 MMOL/L (ref 21–32)
CREAT SERPL-MCNC: 1.5 MG/DL (ref 0.6–1.3)
EOSINOPHIL NFR BLD: 0 % (ref 0–7)
ERYTHROCYTE [DISTWIDTH] IN BLOOD BY AUTOMATED COUNT: 12.2 % (ref 11.5–14.5)
GLUCOSE SERPL-MCNC: 253 MG/DL (ref 74–106)
HCT VFR BLD CALC: 46.2 % (ref 42–54)
HGB BLD-MCNC: 15.7 G/DL (ref 13.5–17.5)
IMM GRANULOCYTES NFR BLD: 0.8 % (ref 0–5)
LYMPHOCYTES NFR BLD AUTO: 6.7 % (ref 15–50)
MCH RBC QN AUTO: 33.1 PG (ref 26–34)
MCHC RBC AUTO-ENTMCNC: 34 G/DL (ref 31–37)
MCV RBC: 97.3 FL (ref 80–100)
MONOCYTES NFR BLD: 3.1 % (ref 2–11)
NEUTROPHILS NFR BLD AUTO: 89.3 % (ref 40–80)
OSMOLALITY SERPL CALC.SUM OF ELEC: 312 MOSM/KG (ref 275–300)
PLATELET # BLD: 258 10X3/UL (ref 130–400)
PMV BLD AUTO: 11.5 FL (ref 7.4–10.4)
POTASSIUM SERPL-SCNC: 4.5 MMOL/L (ref 3.5–5.1)
RBC # BLD AUTO: 4.75 10X6/UL (ref 4.2–6.1)
SODIUM SERPL-SCNC: 140 MMOL/L (ref 136–145)
WBC # BLD AUTO: 13.2 10X3/UL (ref 4.8–10.8)

## 2019-01-18 NOTE — NUR
PT LAYING IN BED RESTING THIS AM, WIFE AT BEDSIDE. PT AWOKEN EASILY TO VERBAL
STIMULI. MOOD PLEASANT, VOICES THAT HE IS BREATHING WELL, RESPIRATIONS EVEN
AND UNLABORED, NO S/S OF DISTRESS NOTED. O2 @ 5L VIA HFNC. IV SALINE LOCKED TO
RIGHT HAND. DENIES NEEDS AT THIS TIME. BED LOW AND LOCKED, SR UP X2 ,CL IN
EASY REACH. WILL CONTINUE TO MONITOR.

## 2019-01-18 NOTE — NUR
[facility] CALLED TO GET UPDATE. TOLD THEM THAT OUR GOAL IS TO WEEN PT TO 5L O2 AND
SPO2% >90% BEFORE PT IS D/DHAVAL. WILL CONTINUE POC.

## 2019-01-18 NOTE — NUR
PT ALERT AND ORIENTED WHEN ENTERING THE ROOM. SALINE LOCKED IV TO THE RIGHT
HAND. PT WEARING NC WITH 5L O2. WIFE AT BEDSIDE. ABDOMEN DISTENDED WITH ACTIVE
BOWEL SOUNDS. PT LUNGS PRESENT WITH DIMINSHED BILATERAL LOBES. PT WEARING
SCD'S. PT REQUESTS PRN "MEDS THAT MAKE ME SLEEP". CALL LIGHT IN REACH.

## 2019-01-19 VITALS — DIASTOLIC BLOOD PRESSURE: 53 MMHG | SYSTOLIC BLOOD PRESSURE: 89 MMHG

## 2019-01-19 VITALS — DIASTOLIC BLOOD PRESSURE: 43 MMHG | SYSTOLIC BLOOD PRESSURE: 79 MMHG

## 2019-01-19 VITALS — DIASTOLIC BLOOD PRESSURE: 58 MMHG | SYSTOLIC BLOOD PRESSURE: 135 MMHG

## 2019-01-19 VITALS — SYSTOLIC BLOOD PRESSURE: 107 MMHG | DIASTOLIC BLOOD PRESSURE: 61 MMHG

## 2019-01-19 VITALS — DIASTOLIC BLOOD PRESSURE: 75 MMHG | SYSTOLIC BLOOD PRESSURE: 140 MMHG

## 2019-01-19 VITALS — SYSTOLIC BLOOD PRESSURE: 120 MMHG | DIASTOLIC BLOOD PRESSURE: 71 MMHG

## 2019-01-19 LAB
ANION GAP SERPL CALC-SCNC: 5.9 MMOL/L (ref 8–16)
BASOPHILS NFR BLD AUTO: 0.2 % (ref 0–2)
BUN SERPL-MCNC: 86 MG/DL (ref 7–18)
CALCIUM SERPL-MCNC: 9.1 MG/DL (ref 8.5–10.1)
CHLORIDE SERPL-SCNC: 97 MMOL/L (ref 98–107)
CO2 SERPL-SCNC: 38.8 MMOL/L (ref 21–32)
CREAT SERPL-MCNC: 1.2 MG/DL (ref 0.6–1.3)
EOSINOPHIL NFR BLD: 0.4 % (ref 0–7)
ERYTHROCYTE [DISTWIDTH] IN BLOOD BY AUTOMATED COUNT: 12 % (ref 11.5–14.5)
GLUCOSE SERPL-MCNC: 133 MG/DL (ref 74–106)
HCT VFR BLD CALC: 45.7 % (ref 42–54)
HGB BLD-MCNC: 15.5 G/DL (ref 13.5–17.5)
IMM GRANULOCYTES NFR BLD: 1 % (ref 0–5)
LYMPHOCYTES NFR BLD AUTO: 26.4 % (ref 15–50)
MCH RBC QN AUTO: 32.5 PG (ref 26–34)
MCHC RBC AUTO-ENTMCNC: 33.9 G/DL (ref 31–37)
MCV RBC: 95.8 FL (ref 80–100)
MONOCYTES NFR BLD: 7.1 % (ref 2–11)
NEUTROPHILS NFR BLD AUTO: 64.9 % (ref 40–80)
OSMOLALITY SERPL CALC.SUM OF ELEC: 301 MOSM/KG (ref 275–300)
PLATELET # BLD: 123 10X3/UL (ref 130–400)
PMV BLD AUTO: 12.3 FL (ref 7.4–10.4)
POTASSIUM SERPL-SCNC: 4.7 MMOL/L (ref 3.5–5.1)
RBC # BLD AUTO: 4.77 10X6/UL (ref 4.2–6.1)
SODIUM SERPL-SCNC: 137 MMOL/L (ref 136–145)
WBC # BLD AUTO: 13.3 10X3/UL (ref 4.8–10.8)

## 2019-01-19 NOTE — NUR
WIFE AT BEDSIDE WTH INCONTINENCE AT TIMES. TELEMETRY SR 63. DENIES ANY CHEST
DISCOMFORT. LUNGS CTA. ENCOURAGED TO USE CALL LIGHT FOR ASSIST AND VERBALIZED
UNDERSTANDING

## 2019-01-19 NOTE — NUR
OT NOTE: ASSISTED PT WITH TRANSFER FROM CHAIR TO BED; PT VERY WEAK; REQUIRED
MAX X 2 AND USE OF RW, AS PT HAD TO BE PLACED BACK ON TO BED. GAVE OUT MID WAY
DURING TRANSFER.
ALFONSO HANSON, OTR/L

## 2019-01-19 NOTE — NUR
PT RESTING IN BED, WARM TOWEL OVER FOREHEAD. PT STATED THAT HE HASN'T HAD A
BOWEL MOVEMENT IN 3 DAYS. DISCUSSED WITH JAY DELAROSA. DENIES FURTHER NEEDS.
CL IN EASY REACH. PRODUCTIVE COUGH NOTICED, YELLOW FROTHY SPUTUM.

## 2019-01-19 NOTE — NUR
PT O2 SAT RESTING AT 85% ON 5L, RT INFORMED, TURNED HIM UP TO 7L AND IT MOVED
HIS SATS UP TO 87%. PT'S CHEST AUSCULTATED, SOUNDS VERY CONGESTED. RT TURNED
HIM UP TO 10L VIA HFNC. PT NOW RESTING AT 91% O2 SAT VIA 10L OF O2. JAY DELAROSA NOTIFIED, CHEST X-RAY ORDERED. CL IN EASY REACH. WILL CONTINUE TO
MONITOR.

## 2019-01-19 NOTE — NUR
OT NOTE: BED MOB WITH MOD ASSIST; INDEP WITH SITTING BALANCE ON EDGE OF BED;
PT STATED THAT HE FELT LIKE HE NEEDED TO HAVE BM SO BS COMMODE WAS PROVIDED;
MAX ASSIST WITH TRANSFER FROM BED TO BS COMMODE; PT STAYED ON COMMODE FOR A
WHILE BUT WAS UNABLE TO DO ANYTHING. TRANSFERRED FROM THERE TO THE CHAIR WITH
MAX ASSIST;  GROOMING TASKS WITH SET UP EXCLUDING COMBING HAIR WHERE HE
REQUIRED MOD ASSIST. AROM EXS WITH B UES.
ALFONSO HANSON, OTR/L

## 2019-01-19 NOTE — NUR
PT LAYING IN BED RESTING WITH EYES CLOSED, WIFE AT BEDSIDE. DENIES NEEDS AT
THIS TIME. CL IN EASY REACH.

## 2019-01-19 NOTE — NUR
PT ALERT AND ORIENTED WHEN ENTERING THE ROOM. LIGHTS OFF AND APPEARS THAT PT
AND WIFE ARE ATTEMPTING TO GO TO BED. ASKED PT HOW HIS PAIN WAS. STATES NO
PAIN. PT STATED THAT HE NEEDED MORE OXYGEN. CONTINUOUS PULSE OX READS PT IS
SATING AT 92% ON 7L. SPOKE WITH PT ABOUT DOCTORS ORDERS AND PT STATES
UNDERSTANDING. INSTRUCTED PT ON DEEP BREATHING IN THROUGH NOSE AND OUT THROUGH
MOUTH. PT SATS INCREASED TO 94%. PT REQUESTS "SLEEP MEDS". RIGHT HAND IV IS
SALINE LOCKED. 7 L NC. CONTINUOUS IN ROOM V/S. CALL LIGHT IN REACH.

## 2019-01-20 VITALS — SYSTOLIC BLOOD PRESSURE: 115 MMHG | DIASTOLIC BLOOD PRESSURE: 56 MMHG

## 2019-01-20 VITALS — DIASTOLIC BLOOD PRESSURE: 56 MMHG | SYSTOLIC BLOOD PRESSURE: 92 MMHG

## 2019-01-20 VITALS — DIASTOLIC BLOOD PRESSURE: 55 MMHG | SYSTOLIC BLOOD PRESSURE: 105 MMHG

## 2019-01-20 VITALS — DIASTOLIC BLOOD PRESSURE: 66 MMHG | SYSTOLIC BLOOD PRESSURE: 112 MMHG

## 2019-01-20 VITALS — SYSTOLIC BLOOD PRESSURE: 110 MMHG | DIASTOLIC BLOOD PRESSURE: 49 MMHG

## 2019-01-20 LAB
ANION GAP SERPL CALC-SCNC: 4.8 MMOL/L (ref 8–16)
BASOPHILS NFR BLD AUTO: 0.1 % (ref 0–2)
BUN SERPL-MCNC: 100 MG/DL (ref 7–18)
CALCIUM SERPL-MCNC: 8.9 MG/DL (ref 8.5–10.1)
CHLORIDE SERPL-SCNC: 99 MMOL/L (ref 98–107)
CO2 SERPL-SCNC: 39 MMOL/L (ref 21–32)
CREAT SERPL-MCNC: 1.6 MG/DL (ref 0.6–1.3)
EOSINOPHIL NFR BLD: 0.4 % (ref 0–7)
ERYTHROCYTE [DISTWIDTH] IN BLOOD BY AUTOMATED COUNT: 12.2 % (ref 11.5–14.5)
GLUCOSE SERPL-MCNC: 126 MG/DL (ref 74–106)
HCT VFR BLD CALC: 46.1 % (ref 42–54)
HGB BLD-MCNC: 15.4 G/DL (ref 13.5–17.5)
IMM GRANULOCYTES NFR BLD: 0.9 % (ref 0–5)
LYMPHOCYTES NFR BLD AUTO: 22.8 % (ref 15–50)
MCH RBC QN AUTO: 32.2 PG (ref 26–34)
MCHC RBC AUTO-ENTMCNC: 33.4 G/DL (ref 31–37)
MCV RBC: 96.4 FL (ref 80–100)
MONOCYTES NFR BLD: 6.5 % (ref 2–11)
NEUTROPHILS NFR BLD AUTO: 69.3 % (ref 40–80)
OSMOLALITY SERPL CALC.SUM OF ELEC: 310 MOSM/KG (ref 275–300)
PLATELET # BLD: 239 10X3/UL (ref 130–400)
PMV BLD AUTO: 11.4 FL (ref 7.4–10.4)
POTASSIUM SERPL-SCNC: 3.8 MMOL/L (ref 3.5–5.1)
RBC # BLD AUTO: 4.78 10X6/UL (ref 4.2–6.1)
SODIUM SERPL-SCNC: 139 MMOL/L (ref 136–145)
WBC # BLD AUTO: 15 10X3/UL (ref 4.8–10.8)

## 2019-01-20 NOTE — NUR
PT LYING IN BED RESTING, NO SIGNS OF DISTRESS. WIFE AT BEDSIDE. CL IN REACH,
WILL CONTINUE TO MONITOR

## 2019-01-20 NOTE — NUR
02 WAS AT 87% WITH 10LPN VIA HIGH FLOW NC, SHALLOW BREATHING, EVEN, LUNG
SOUNDS IN ALL OLMOS HAVE EXIPRATORY WHEEZING WITH COARSE CRACKLES.
RESPIRATORY CONSULTED. WILL MONITOR AND DISCUSS TREATMENT OPTIONS WHEN THEY
ARRIVE TO UNIT. ELEVATED HOB TO 45 DEGREES, INCREASED 02 FLOW TO 12. BED
LOWERED AND LOCKED, FAMILY MEMBER PRESENT, CALL LIGHT WITHIN REACH. CPOC

## 2019-01-20 NOTE — NUR
RESTING QUIETLY IN BED. WIFE AT BEDSIDE. DISCUSSED USE OF BIPAP WITH PATIENT
AND HE IS ADAMANT THAT HE CANT STAND IT. DENIES NEEDS. 02 SAT 91 AT 12LNC.

## 2019-01-20 NOTE — NUR
SEVERAL BOWEL MOVEMENTS THROUGH OUT THE NIGHT. PT USES CALL LIGHT AND
UNDERSTANDS TO NOTIFY STAFF WHEN BM OCCURS. PT REFUSES BED PAN.

## 2019-01-20 NOTE — NUR
RESPIRTORY EVAULATED AND STATED, "ISN'T TAKING ENOUGH BREATHS IN TO KEEP HIS
O2 ELEVATED" HE HAS A HX OF AURA, AND USE OF BIPAP, FAMILY MEMBER STATED HE
WILL NOT WEAR A BIPAP UNLESS HIS ENTIRE FACE IS COVERED. RT TURNED 02 TO 14LPM
VIA HIGH FLOW. 02 SATS TREND DOWN WHEN PT IS RESTING. WILL CONTINUE TO
MONITOR. DENIES ANY NEEDS OR DISCOMFORTS, BED LOWERED AND LOCKED, CALL LIGHT
WITHIN REACH. CPOC

## 2019-01-21 VITALS — DIASTOLIC BLOOD PRESSURE: 76 MMHG | SYSTOLIC BLOOD PRESSURE: 146 MMHG

## 2019-01-21 VITALS — DIASTOLIC BLOOD PRESSURE: 57 MMHG | SYSTOLIC BLOOD PRESSURE: 98 MMHG

## 2019-01-21 VITALS — DIASTOLIC BLOOD PRESSURE: 68 MMHG | SYSTOLIC BLOOD PRESSURE: 129 MMHG

## 2019-01-21 VITALS — SYSTOLIC BLOOD PRESSURE: 114 MMHG | DIASTOLIC BLOOD PRESSURE: 67 MMHG

## 2019-01-21 VITALS — SYSTOLIC BLOOD PRESSURE: 122 MMHG | DIASTOLIC BLOOD PRESSURE: 69 MMHG

## 2019-01-21 VITALS — DIASTOLIC BLOOD PRESSURE: 50 MMHG | SYSTOLIC BLOOD PRESSURE: 131 MMHG

## 2019-01-21 LAB
ANION GAP SERPL CALC-SCNC: 8.7 MMOL/L (ref 8–16)
BASOPHILS NFR BLD AUTO: 0.1 % (ref 0–2)
BUN SERPL-MCNC: 81 MG/DL (ref 7–18)
CALCIUM SERPL-MCNC: 8.7 MG/DL (ref 8.5–10.1)
CHLORIDE SERPL-SCNC: 100 MMOL/L (ref 98–107)
CO2 SERPL-SCNC: 35.4 MMOL/L (ref 21–32)
CREAT SERPL-MCNC: 1.3 MG/DL (ref 0.6–1.3)
EOSINOPHIL NFR BLD: 0.1 % (ref 0–7)
ERYTHROCYTE [DISTWIDTH] IN BLOOD BY AUTOMATED COUNT: 12.1 % (ref 11.5–14.5)
GLUCOSE SERPL-MCNC: 184 MG/DL (ref 74–106)
HCT VFR BLD CALC: 43.1 % (ref 42–54)
HGB BLD-MCNC: 14.5 G/DL (ref 13.5–17.5)
IMM GRANULOCYTES NFR BLD: 0.3 % (ref 0–5)
LYMPHOCYTES NFR BLD AUTO: 7.7 % (ref 15–50)
MCH RBC QN AUTO: 32.4 PG (ref 26–34)
MCHC RBC AUTO-ENTMCNC: 33.6 G/DL (ref 31–37)
MCV RBC: 96.4 FL (ref 80–100)
MONOCYTES NFR BLD: 5.8 % (ref 2–11)
NEUTROPHILS NFR BLD AUTO: 86 % (ref 40–80)
OSMOLALITY SERPL CALC.SUM OF ELEC: 307 MOSM/KG (ref 275–300)
PLATELET # BLD: 213 10X3/UL (ref 130–400)
PMV BLD AUTO: 11.3 FL (ref 7.4–10.4)
POTASSIUM SERPL-SCNC: 4.1 MMOL/L (ref 3.5–5.1)
RBC # BLD AUTO: 4.47 10X6/UL (ref 4.2–6.1)
SODIUM SERPL-SCNC: 140 MMOL/L (ref 136–145)
WBC # BLD AUTO: 15.2 10X3/UL (ref 4.8–10.8)

## 2019-01-21 NOTE — NUR
ALERT AND ORIENTED RECEIVES HI-FLOW O2 AT 14 LITERS. IVF INFUSING TO RT WRIST
AT PRESCRIBED RATE. TELEMETRY INTACT. WIFE PRESENT IN ROOM AND ENCOURAGED TO
USE CALL LIGHT FOR ASSSIT.VERBALIZED UNDERSTANDING.

## 2019-01-21 NOTE — NUR
OT NOTE: PT REQUIRED MUCH ENCOURAGEMENT TO GET UP OUT OF BED TODAY; MOD ASSIST
FOR SUPINE TO SIT; AROM EXS ON EDGE OF BED WITH CONSTANT CUES TO CONTINUE;
TRANSFER FROM BED TO CHAIR WITH MOD ASSIST X 2 AND USE OF RW; GROOMING TASKS
WHILE UP IN CHAIR. PT TOLERATED SITTING UP FOR APPROX 2 HRS. IN AFTERNOON, PT
ABLE TO STAND FOR APPROX 1-2 MIN WHILE BEING CLEANED. CONTINUES WITH DIARRHEA;
MOD ASSIST FOR TRANSFER BACK INTO BED; REPOSITIONED IN BED.
ALFONSO HANSON, OTR/L

## 2019-01-21 NOTE — NUR
A/OX4. BREATHING EVEN AND LABORED. ON HIGH FLOW O2. WIFE AT BEDSIDE. PT C/O
NECK/BACK PAIN. GAVE PN MEDS. DENIES OTHER NEEDS. WILL CONTINUE POC.

## 2019-01-22 VITALS — SYSTOLIC BLOOD PRESSURE: 135 MMHG | DIASTOLIC BLOOD PRESSURE: 63 MMHG

## 2019-01-22 VITALS — DIASTOLIC BLOOD PRESSURE: 58 MMHG | SYSTOLIC BLOOD PRESSURE: 116 MMHG

## 2019-01-22 VITALS — DIASTOLIC BLOOD PRESSURE: 60 MMHG | SYSTOLIC BLOOD PRESSURE: 154 MMHG

## 2019-01-22 VITALS — SYSTOLIC BLOOD PRESSURE: 122 MMHG | DIASTOLIC BLOOD PRESSURE: 74 MMHG

## 2019-01-22 VITALS — SYSTOLIC BLOOD PRESSURE: 119 MMHG | DIASTOLIC BLOOD PRESSURE: 64 MMHG

## 2019-01-22 LAB
ANION GAP SERPL CALC-SCNC: 8.6 MMOL/L (ref 8–16)
BASOPHILS NFR BLD AUTO: 0 % (ref 0–2)
BUN SERPL-MCNC: 70 MG/DL (ref 7–18)
CALCIUM SERPL-MCNC: 8.8 MG/DL (ref 8.5–10.1)
CHLORIDE SERPL-SCNC: 102 MMOL/L (ref 98–107)
CO2 SERPL-SCNC: 34 MMOL/L (ref 21–32)
CREAT SERPL-MCNC: 1 MG/DL (ref 0.6–1.3)
EOSINOPHIL NFR BLD: 0.3 % (ref 0–7)
ERYTHROCYTE [DISTWIDTH] IN BLOOD BY AUTOMATED COUNT: 12.2 % (ref 11.5–14.5)
GLUCOSE SERPL-MCNC: 152 MG/DL (ref 74–106)
HCT VFR BLD CALC: 42 % (ref 42–54)
HGB BLD-MCNC: 14.1 G/DL (ref 13.5–17.5)
IMM GRANULOCYTES NFR BLD: 0.3 % (ref 0–5)
LYMPHOCYTES NFR BLD AUTO: 5.3 % (ref 15–50)
MCH RBC QN AUTO: 31.8 PG (ref 26–34)
MCHC RBC AUTO-ENTMCNC: 33.6 G/DL (ref 31–37)
MCV RBC: 94.8 FL (ref 80–100)
MONOCYTES NFR BLD: 4 % (ref 2–11)
NEUTROPHILS NFR BLD AUTO: 90.1 % (ref 40–80)
OSMOLALITY SERPL CALC.SUM OF ELEC: 302 MOSM/KG (ref 275–300)
PLATELET # BLD: 184 10X3/UL (ref 130–400)
PMV BLD AUTO: 11.6 FL (ref 7.4–10.4)
POTASSIUM SERPL-SCNC: 4.6 MMOL/L (ref 3.5–5.1)
RBC # BLD AUTO: 4.43 10X6/UL (ref 4.2–6.1)
SODIUM SERPL-SCNC: 140 MMOL/L (ref 136–145)
WBC # BLD AUTO: 13.2 10X3/UL (ref 4.8–10.8)

## 2019-01-22 NOTE — NUR
AWAKE,ALERT NO COMPALITNS VOICED. O2 @ 12 PER HIGHFLOW CANNULA ON. NO DISTRESS
NOTED. IV INFUSING TO RIGHT HAND WITHOUT REDNESS OR EDEMA NOTED. INCONTINENT
OF B/B WITH [facility] GIVEN. POSITIONED TO RIGHT SIDE WITH PILLOWS FOR
COMFORT. CL IN REACH. WIFE AT BEDSIDE

## 2019-01-22 NOTE — NUR
OT NOTE: PT AGAIN RESISTIVE TO GETTING UP OUT OF BED. 02 HAD BEEN INCREASED TO
10L.  CHECKED WITH NURSING TO MAKE SURE HE WAS OK TO GET UP.   MIN ASSIST WITH
BED MOB; MOD ASSIST WITH USE OF WALKER TO TRANSFER TO CHAIR; SIMPLE GROOMING
WITH MIN ASSIST.
ALFONSO HANSON, OTR/L

## 2019-01-22 NOTE — NUR
NUTRITION F/U
CHART REVIEWED. PT TOLERATING AHA [facility] SOFT DIET. 50% INTAKE RECENT MEALS.
WILL CONTINUE TO PROVIDE DIET, MONITOR PO INTAKE.
RD FOLLOWING

## 2019-01-22 NOTE — NUR
PT SITTING UP IN CHAIR AT BEDSIDE NO ACUTE DISTRESS NOTED AT THIS TIME.  CL
WITHIN REACH.  LOY WELL SITTING UP AT BEDSIDE.  WILL CONTINUE TO MONITOR.

## 2019-01-22 NOTE — NUR
PT SITTING UP IN BED EATING BREAKFAST.  NO ACUTE DISTRESS NOTED.  O2 SATS 92%
WITH O2 @ 12L HI DANIKA NC.  IV TO RIGHT HAND WITH NS @ 10ML/HR INFUSING VIA
PUMP SITE WITHOUT REDNESS OR EDEMA.  REPORTS PAIN 7/10 IN BACK AREA.  PAIN
MEDS ADMINISTERED PER ORDERS.  DENIES FURTHER NEEDS AT THIS TIME.  CL WITHIN
REACH. SPOUSE AT BEDSIDE.  ENCOURAGED TO CALL WITH NEEDS.  CONTINUE POC

## 2019-01-23 VITALS — SYSTOLIC BLOOD PRESSURE: 150 MMHG | DIASTOLIC BLOOD PRESSURE: 90 MMHG

## 2019-01-23 VITALS — DIASTOLIC BLOOD PRESSURE: 87 MMHG | SYSTOLIC BLOOD PRESSURE: 174 MMHG

## 2019-01-23 VITALS — DIASTOLIC BLOOD PRESSURE: 87 MMHG | SYSTOLIC BLOOD PRESSURE: 139 MMHG

## 2019-01-23 VITALS — SYSTOLIC BLOOD PRESSURE: 154 MMHG | DIASTOLIC BLOOD PRESSURE: 60 MMHG

## 2019-01-23 LAB
ANION GAP SERPL CALC-SCNC: 11.2 MMOL/L (ref 8–16)
BASOPHILS NFR BLD AUTO: 0.1 % (ref 0–2)
BUN SERPL-MCNC: 56 MG/DL (ref 7–18)
CALCIUM SERPL-MCNC: 9 MG/DL (ref 8.5–10.1)
CHLORIDE SERPL-SCNC: 101 MMOL/L (ref 98–107)
CO2 SERPL-SCNC: 29.7 MMOL/L (ref 21–32)
CREAT SERPL-MCNC: 0.9 MG/DL (ref 0.6–1.3)
EOSINOPHIL NFR BLD: 0.3 % (ref 0–7)
ERYTHROCYTE [DISTWIDTH] IN BLOOD BY AUTOMATED COUNT: 12.2 % (ref 11.5–14.5)
GLUCOSE SERPL-MCNC: 161 MG/DL (ref 74–106)
HCT VFR BLD CALC: 42.7 % (ref 42–54)
HGB BLD-MCNC: 14.4 G/DL (ref 13.5–17.5)
IMM GRANULOCYTES NFR BLD: 0.3 % (ref 0–5)
LYMPHOCYTES NFR BLD AUTO: 6.1 % (ref 15–50)
MCH RBC QN AUTO: 32.2 PG (ref 26–34)
MCHC RBC AUTO-ENTMCNC: 33.7 G/DL (ref 31–37)
MCV RBC: 95.5 FL (ref 80–100)
MONOCYTES NFR BLD: 4.4 % (ref 2–11)
NEUTROPHILS NFR BLD AUTO: 88.8 % (ref 40–80)
OSMOLALITY SERPL CALC.SUM OF ELEC: 292 MOSM/KG (ref 275–300)
PLATELET # BLD: 172 10X3/UL (ref 130–400)
PMV BLD AUTO: 11.8 FL (ref 7.4–10.4)
POTASSIUM SERPL-SCNC: 4.9 MMOL/L (ref 3.5–5.1)
RBC # BLD AUTO: 4.47 10X6/UL (ref 4.2–6.1)
SODIUM SERPL-SCNC: 137 MMOL/L (ref 136–145)
WBC # BLD AUTO: 13.1 10X3/UL (ref 4.8–10.8)

## 2019-01-23 NOTE — NUR
LPN NOTES - SPOKE WITH DR JURADO ABOUT ENTERING DNR STATUS BASED ON PT
PROVIDED LIVING WILL. PT ON HOSPICE.
12L O2 PER HF NC, COURSE CRACKLES TO ALL FIELDS, WORSE IN LOWER LOBES.
TELEMETRY IN PLACE RUNNING SINUS WITH BBB. FAMILY AT BEDSIDE. NO OTHER NEEDS

## 2019-01-23 NOTE — NUR
A/OX4. PT IV CAME OUT. ATTEMPTED TWICE. HAD ICU NURSE ATTEMPT TWICE. ICU NURSE
GOT ONE IN LT AC, HOWEVER IT ALSO INFILTRATED. PT HAS BEEN STUCK MULTIPLE
TIMES. HE HAS BEEN HERE FOR QUITE SOME TIME. REC VASC ACCESS NURSE TO PUT IN
PICC LINE DUE TO PT GETTING ROUNDS OF VANC. WILL PUT IN VASC CONSULT.

## 2019-01-23 NOTE — NUR
PT RESTING IN BED, EYES OPEN. WIFE AT BEDSIDE. NO C/O PAIN. NO S/S OF ACUTE
DISTRESS NOTED. PT FULL CODE. ON [facility]. INCONTINENT OF BOWEL AND
BLADDER. ON ELECTROLYTE PROTOCOL. ON 12L O2, HIGH FLOW NC. IV TO RIGHT HAND,
SITE PATENT WITHOUT REDNESS OR SWELLING. NS INFUSING @ 10ML/HR. PT ACHS.
TELEMETRY SR BBB. PT DENIES ANYTHING FURTHER AT THIS TIME. CALL LIGHT IN
REACH. FALL PRECAUTIONS IN PLACE. WILL CONITNUE TO MONITOR.

## 2019-01-23 NOTE — NUR
PT RESTING IN BED EYES OPEN. NO C/O PAIN. NO S/S OF ACUTE DISTRESS NOTED. PT
DENIES ANYTHING FURTHER AT THIS TIME. CALL LIGHT IN REACH. FALL PRECAUTIONS IN
PLACE. WIFE AT BEDSIDE. WILL CONTINUE TO MONITOR.

## 2019-01-23 NOTE — NUR
PT HAS FREQUENCY OF URINE. CHANGED BED TWICE IN LESS THAN AN HOUR. PUT CONDOM
CATH ON TO PREVENT SKIN BREAKDOWN SINCE THERE IS A PROBABILITY SOME TIME WOULD
PASS BEFORE ABLE TO GET TO ROOM. WILL CONTINUE POC.

## 2019-01-23 NOTE — NUR
IV INFILTRATED, DISCONTINUED IV, CATHETER TIP INTACT. UNABLE TO GET IV ACCESS.
CALLED FELY FROM VASCULAR TO COME ASSESS. PT DENIES PAIN. NO S/S OF ACUTE
DISTRESS. WIFE AT BEDSIDE. FALL PRECAUTIONS IN PLACE. WILL CONTINUE TO
MONITOR.

## 2019-01-24 VITALS — SYSTOLIC BLOOD PRESSURE: 147 MMHG | DIASTOLIC BLOOD PRESSURE: 89 MMHG

## 2019-01-24 VITALS — SYSTOLIC BLOOD PRESSURE: 143 MMHG | DIASTOLIC BLOOD PRESSURE: 73 MMHG

## 2019-01-24 VITALS — SYSTOLIC BLOOD PRESSURE: 149 MMHG | DIASTOLIC BLOOD PRESSURE: 83 MMHG

## 2019-01-24 VITALS — DIASTOLIC BLOOD PRESSURE: 91 MMHG | SYSTOLIC BLOOD PRESSURE: 114 MMHG

## 2019-01-24 VITALS — DIASTOLIC BLOOD PRESSURE: 74 MMHG | SYSTOLIC BLOOD PRESSURE: 138 MMHG

## 2019-01-24 LAB
ANION GAP SERPL CALC-SCNC: 9.6 MMOL/L (ref 8–16)
BASOPHILS NFR BLD AUTO: 0.1 % (ref 0–2)
BUN SERPL-MCNC: 50 MG/DL (ref 7–18)
CALCIUM SERPL-MCNC: 9.2 MG/DL (ref 8.5–10.1)
CHLORIDE SERPL-SCNC: 102 MMOL/L (ref 98–107)
CO2 SERPL-SCNC: 32 MMOL/L (ref 21–32)
CREAT SERPL-MCNC: 0.8 MG/DL (ref 0.6–1.3)
EOSINOPHIL NFR BLD: 2.1 % (ref 0–7)
ERYTHROCYTE [DISTWIDTH] IN BLOOD BY AUTOMATED COUNT: 12.2 % (ref 11.5–14.5)
GLUCOSE SERPL-MCNC: 112 MG/DL (ref 74–106)
HCT VFR BLD CALC: 46.3 % (ref 42–54)
HGB BLD-MCNC: 15.6 G/DL (ref 13.5–17.5)
IMM GRANULOCYTES NFR BLD: 0.3 % (ref 0–5)
LYMPHOCYTES NFR BLD AUTO: 13.9 % (ref 15–50)
MCH RBC QN AUTO: 32.1 PG (ref 26–34)
MCHC RBC AUTO-ENTMCNC: 33.7 G/DL (ref 31–37)
MCV RBC: 95.3 FL (ref 80–100)
MONOCYTES NFR BLD: 7.2 % (ref 2–11)
NEUTROPHILS NFR BLD AUTO: 76.4 % (ref 40–80)
OSMOLALITY SERPL CALC.SUM OF ELEC: 291 MOSM/KG (ref 275–300)
PLATELET # BLD: 200 10X3/UL (ref 130–400)
PMV BLD AUTO: 11.3 FL (ref 7.4–10.4)
POTASSIUM SERPL-SCNC: 4.6 MMOL/L (ref 3.5–5.1)
RBC # BLD AUTO: 4.86 10X6/UL (ref 4.2–6.1)
SODIUM SERPL-SCNC: 139 MMOL/L (ref 136–145)
WBC # BLD AUTO: 14.7 10X3/UL (ref 4.8–10.8)

## 2019-01-24 NOTE — NUR
PT RESTING IN BED, EYES CLOSED. NO C/O PAIN. NO S/S OF ACUTED DISTRESS NOTED.
PT ON TELEMETRY 72 BBB. ON ELECTROLYTE PROTOCOL. PT ON HOSPICE, DNR. NO IV
ACCESS AT THIS TIME, VASCULAR ACCESS HAS BEEN CONSULTED. WIFE AT BEDSIDE. FALL
PRECAUTIONS IN PLACE. PT ACHS. PT ON 12L O2, HIGH FLOW NC. PT DENIES ANYTHING
FURTHER AT THIS TIME. CALL LIGHT IN REACH. WILL CONTINUE TO MONITOR.

## 2019-01-24 NOTE — NUR
FELY FROM VASCULAR INSERTED A MIDLINE TO RIGHT UPPER ARM. PT DENIES ANYTHING
FURTHER AT THIS TIME. CALL LIGHT IN REACH. WILL CONTINUE TO MONITOR.

## 2019-01-24 NOTE — NUR
OT NOTE( LATE ENTRY FOR 1/23/19)  PT CONT TO C/O OF PAIN "EVERYWHERE" .
RESISTANT TO GETTING UP OUT OF BED STATING THAT HE IS "DIEING". PT EASILY
ENCOURAGED TO SIT UP ON EDGE OF BED ( BED MOB WITH MIN ASSIST). PT DOING
BETTER WITH TRANSFERS. PT USES RW TO AMB A FEW STEPS FROM BED TO CHAIR WITH
MIN ASSIST. TOLERATED SITTING UP IN CHAIR APPROX 1.5 HRS. SIMPLE GROOMING
TASKS WITH SET UP.
ALFONSO HANSON, OTR/L

## 2019-01-24 NOTE — NUR
PT RESTING IN BED EYES CLOSED. RESPIRATIONS EVEN AND UNLABORED. NO C/O PAIN.
NO S/S OF ACUTE DISTRESS NOTED. CALL LIGHT IN REACH. WIFE AT BEDSIDE. FALL
PRECAUTIONS IN PLACE. PT DENIES ANYTHING FURTHER AT THIS TIME. WILL CONTINUE
TO MONITOR.

## 2019-01-25 VITALS — DIASTOLIC BLOOD PRESSURE: 77 MMHG | SYSTOLIC BLOOD PRESSURE: 180 MMHG

## 2019-01-25 VITALS — DIASTOLIC BLOOD PRESSURE: 79 MMHG | SYSTOLIC BLOOD PRESSURE: 150 MMHG

## 2019-01-25 VITALS — SYSTOLIC BLOOD PRESSURE: 143 MMHG | DIASTOLIC BLOOD PRESSURE: 65 MMHG

## 2019-01-25 VITALS — SYSTOLIC BLOOD PRESSURE: 176 MMHG | DIASTOLIC BLOOD PRESSURE: 81 MMHG

## 2019-01-25 LAB
ANION GAP SERPL CALC-SCNC: 15.6 MMOL/L (ref 8–16)
BASOPHILS NFR BLD AUTO: 0.1 % (ref 0–2)
BUN SERPL-MCNC: 49 MG/DL (ref 7–18)
CALCIUM SERPL-MCNC: 8.8 MG/DL (ref 8.5–10.1)
CHLORIDE SERPL-SCNC: 101 MMOL/L (ref 98–107)
CO2 SERPL-SCNC: 28.7 MMOL/L (ref 21–32)
CREAT SERPL-MCNC: 0.9 MG/DL (ref 0.6–1.3)
EOSINOPHIL NFR BLD: 0.2 % (ref 0–7)
ERYTHROCYTE [DISTWIDTH] IN BLOOD BY AUTOMATED COUNT: 12.1 % (ref 11.5–14.5)
GLUCOSE SERPL-MCNC: 160 MG/DL (ref 74–106)
HCT VFR BLD CALC: 46.3 % (ref 42–54)
HGB BLD-MCNC: 15.5 G/DL (ref 13.5–17.5)
IMM GRANULOCYTES NFR BLD: 0.4 % (ref 0–5)
LYMPHOCYTES NFR BLD AUTO: 7.7 % (ref 15–50)
MCH RBC QN AUTO: 32.3 PG (ref 26–34)
MCHC RBC AUTO-ENTMCNC: 33.5 G/DL (ref 31–37)
MCV RBC: 96.5 FL (ref 80–100)
MONOCYTES NFR BLD: 3.7 % (ref 2–11)
NEUTROPHILS NFR BLD AUTO: 87.9 % (ref 40–80)
OSMOLALITY SERPL CALC.SUM OF ELEC: 294 MOSM/KG (ref 275–300)
PLATELET # BLD: 140 10X3/UL (ref 130–400)
PMV BLD AUTO: 12.3 FL (ref 7.4–10.4)
POTASSIUM SERPL-SCNC: 5.3 MMOL/L (ref 3.5–5.1)
RBC # BLD AUTO: 4.8 10X6/UL (ref 4.2–6.1)
SODIUM SERPL-SCNC: 140 MMOL/L (ref 136–145)
WBC # BLD AUTO: 11.9 10X3/UL (ref 4.8–10.8)

## 2019-01-25 NOTE — NUR
PATIENT IN BED CONDOM CATHETER OFF GOT A NEW ONE AND APPLIED. WIFE AT BEDSIDE,
DENIES ANY C/O PAIN OR DISCOMFORT WHEN ASKED.

## 2019-01-25 NOTE — NUR
PATIENT IN BED, SKIN W/D TO TOUCH, COLOR PINK, RESP. REGULAR AND EVEN AT 20
WITH O2 AT 2 L/M NC. PATIENT'S POTASSIUM 5.3 AND MARYLOU BARR GAVE OR
FOR KAEXELATE 30MLS PO X'S 1. WIFE AT BEDSIDE. DENIES ANY C/O PAIN OR
DISCOMFORT WHEN ASKED.

## 2019-01-25 NOTE — NUR
THE PATIENT WAS TALKING TO HIS WIFE WHEN STAFF ENTERED HIS AREA.  BED IN THE
LOW POSITION WITH SIDERAILS X2 AND CALL LIGHT WITHIN REACH.  WIFE STAYING WITH
PATIENT.  PATIENT AND WAIFE EDUCATED ON SLEEPING MEDICATION AND PAIN
MEDICATION.  PATIENTS WIFE DEMONSTRATES UNDERSTANDING VIA TEACHBACK METHOD.
THE PATIENT APPEARS COMFORTABLE AND HE AND HIS WIFE HAVE NO QUESTIONS OR
CONCERNS AT THIS TIME.

## 2019-01-25 NOTE — NUR
OT NOTE: PT CONTINUALLY STATING THAT HE CANT SIT UP TODAY DUE TO PAIN.
REQUIRED EXT ENCOURAGEMENT AND PT WAS ABLE TO PERFORM BED MOB INCLUDING
ROLLING AND SUPINE TO SIT WITH MIN ASSIST; TRANSFER TO CHAIR WITH USE OF RW
AND MIN/MOD ASSIST. GROSS/FINE MOTOR TASKS WITH B HANDS TO IMPROVE ADLS
ALFONSO HANSON,OTR/L

## 2019-01-25 NOTE — NUR
PATIENT'S BLOOD SUGAR CHECKED  NO INSULIN NEEDED. WIFE AT BEDSIDE, C/L
WITHIN REACH AND SR'S UP X'S 2.

## 2019-01-26 VITALS — SYSTOLIC BLOOD PRESSURE: 154 MMHG | DIASTOLIC BLOOD PRESSURE: 79 MMHG

## 2019-01-26 VITALS — SYSTOLIC BLOOD PRESSURE: 160 MMHG | DIASTOLIC BLOOD PRESSURE: 90 MMHG

## 2019-01-26 VITALS — DIASTOLIC BLOOD PRESSURE: 78 MMHG | SYSTOLIC BLOOD PRESSURE: 166 MMHG

## 2019-01-26 VITALS — SYSTOLIC BLOOD PRESSURE: 136 MMHG | DIASTOLIC BLOOD PRESSURE: 51 MMHG

## 2019-01-26 VITALS — SYSTOLIC BLOOD PRESSURE: 155 MMHG | DIASTOLIC BLOOD PRESSURE: 79 MMHG

## 2019-01-26 VITALS — DIASTOLIC BLOOD PRESSURE: 61 MMHG | SYSTOLIC BLOOD PRESSURE: 140 MMHG

## 2019-01-26 LAB
ALBUMIN SERPL-MCNC: 2.2 G/DL (ref 3.4–5)
ALP SERPL-CCNC: 56 U/L (ref 46–116)
ALT SERPL-CCNC: 21 U/L (ref 10–68)
ANION GAP SERPL CALC-SCNC: 8.8 MMOL/L (ref 8–16)
BASOPHILS NFR BLD AUTO: 0.1 % (ref 0–2)
BILIRUB SERPL-MCNC: 0.65 MG/DL (ref 0.2–1.3)
BUN SERPL-MCNC: 46 MG/DL (ref 7–18)
CALCIUM SERPL-MCNC: 8.7 MG/DL (ref 8.5–10.1)
CHLORIDE SERPL-SCNC: 101 MMOL/L (ref 98–107)
CO2 SERPL-SCNC: 32 MMOL/L (ref 21–32)
CREAT SERPL-MCNC: 0.9 MG/DL (ref 0.6–1.3)
EOSINOPHIL NFR BLD: 0.4 % (ref 0–7)
ERYTHROCYTE [DISTWIDTH] IN BLOOD BY AUTOMATED COUNT: 12.1 % (ref 11.5–14.5)
GLOBULIN SER-MCNC: 3 G/L
GLUCOSE SERPL-MCNC: 195 MG/DL (ref 74–106)
HCT VFR BLD CALC: 42.3 % (ref 42–54)
HGB BLD-MCNC: 14.4 G/DL (ref 13.5–17.5)
IMM GRANULOCYTES NFR BLD: 0.3 % (ref 0–5)
LYMPHOCYTES NFR BLD AUTO: 7.7 % (ref 15–50)
MCH RBC QN AUTO: 32.4 PG (ref 26–34)
MCHC RBC AUTO-ENTMCNC: 34 G/DL (ref 31–37)
MCV RBC: 95.1 FL (ref 80–100)
MONOCYTES NFR BLD: 3.6 % (ref 2–11)
NEUTROPHILS NFR BLD AUTO: 87.9 % (ref 40–80)
OSMOLALITY SERPL CALC.SUM OF ELEC: 290 MOSM/KG (ref 275–300)
PLATELET # BLD: 199 10X3/UL (ref 130–400)
PMV BLD AUTO: 11.5 FL (ref 7.4–10.4)
POTASSIUM SERPL-SCNC: 4.8 MMOL/L (ref 3.5–5.1)
PROT SERPL-MCNC: 5.2 G/DL (ref 6.4–8.2)
RBC # BLD AUTO: 4.45 10X6/UL (ref 4.2–6.1)
SODIUM SERPL-SCNC: 137 MMOL/L (ref 136–145)
WBC # BLD AUTO: 10.7 10X3/UL (ref 4.8–10.8)

## 2019-01-26 NOTE — NUR
RECEIVED REPORT, ASSUMED CARE, WIFE AT BEDSIDE, DENIES NEEDS, VICTORIA TO
GRAVITY, MIDLINE TO SARITA PATENT NON-TENDER, ASSESSMENT COMPLETE, NO S/S OF
DISTRESS NOTED, WILL CONTINUE POC

## 2019-01-27 VITALS — SYSTOLIC BLOOD PRESSURE: 179 MMHG | DIASTOLIC BLOOD PRESSURE: 96 MMHG

## 2019-01-27 VITALS — SYSTOLIC BLOOD PRESSURE: 138 MMHG | DIASTOLIC BLOOD PRESSURE: 61 MMHG

## 2019-01-27 VITALS — SYSTOLIC BLOOD PRESSURE: 168 MMHG | DIASTOLIC BLOOD PRESSURE: 75 MMHG

## 2019-01-27 VITALS — DIASTOLIC BLOOD PRESSURE: 82 MMHG | SYSTOLIC BLOOD PRESSURE: 131 MMHG

## 2019-01-27 VITALS — SYSTOLIC BLOOD PRESSURE: 158 MMHG | DIASTOLIC BLOOD PRESSURE: 81 MMHG

## 2019-01-27 VITALS — DIASTOLIC BLOOD PRESSURE: 71 MMHG | SYSTOLIC BLOOD PRESSURE: 163 MMHG

## 2019-01-27 LAB
ALBUMIN SERPL-MCNC: 2.2 G/DL (ref 3.4–5)
ALP SERPL-CCNC: 53 U/L (ref 46–116)
ALT SERPL-CCNC: 19 U/L (ref 10–68)
ANION GAP SERPL CALC-SCNC: 8.8 MMOL/L (ref 8–16)
BASOPHILS NFR BLD AUTO: 0 % (ref 0–2)
BILIRUB SERPL-MCNC: 0.61 MG/DL (ref 0.2–1.3)
BUN SERPL-MCNC: 40 MG/DL (ref 7–18)
CALCIUM SERPL-MCNC: 8.6 MG/DL (ref 8.5–10.1)
CHLORIDE SERPL-SCNC: 102 MMOL/L (ref 98–107)
CO2 SERPL-SCNC: 30.8 MMOL/L (ref 21–32)
CREAT SERPL-MCNC: 0.8 MG/DL (ref 0.6–1.3)
EOSINOPHIL NFR BLD: 0.1 % (ref 0–7)
ERYTHROCYTE [DISTWIDTH] IN BLOOD BY AUTOMATED COUNT: 12.1 % (ref 11.5–14.5)
GLOBULIN SER-MCNC: 2.9 G/L
GLUCOSE SERPL-MCNC: 135 MG/DL (ref 74–106)
HCT VFR BLD CALC: 42.8 % (ref 42–54)
HGB BLD-MCNC: 14.5 G/DL (ref 13.5–17.5)
IMM GRANULOCYTES NFR BLD: 0.2 % (ref 0–5)
LYMPHOCYTES NFR BLD AUTO: 10.9 % (ref 15–50)
MCH RBC QN AUTO: 31.9 PG (ref 26–34)
MCHC RBC AUTO-ENTMCNC: 33.9 G/DL (ref 31–37)
MCV RBC: 94.1 FL (ref 80–100)
MONOCYTES NFR BLD: 6.1 % (ref 2–11)
NEUTROPHILS NFR BLD AUTO: 82.7 % (ref 40–80)
OSMOLALITY SERPL CALC.SUM OF ELEC: 285 MOSM/KG (ref 275–300)
PLATELET # BLD: 178 10X3/UL (ref 130–400)
PMV BLD AUTO: 11.4 FL (ref 7.4–10.4)
POTASSIUM SERPL-SCNC: 4.6 MMOL/L (ref 3.5–5.1)
PROT SERPL-MCNC: 5.1 G/DL (ref 6.4–8.2)
RBC # BLD AUTO: 4.55 10X6/UL (ref 4.2–6.1)
SODIUM SERPL-SCNC: 137 MMOL/L (ref 136–145)
VANCOMYCIN SERPL-MCNC: 19.9 UG/ML (ref 10–20)
WBC # BLD AUTO: 12.7 10X3/UL (ref 4.8–10.8)

## 2019-01-27 NOTE — NUR
RECEIVED REPORT, ASSUMED CARE, DENIES NEEDS, BED LOWEST POSITION, CALL LIGHT
IN REACH, NO S/S OF DISTRESS NOTED, LINENS CHANGED, WIFE AT BEDSIDE, WILL
CONITNUE POC

## 2019-01-27 NOTE — NUR
Pitent in bed sleeping with O2 in place via n/c. Wife at bedside, no needs
noted  no s/s of distress, call light in reach.

## 2019-01-27 NOTE — NUR
WIFE AT BEDSIDE STAYED NO NEEDS. PATIENT ASLEEP WITH NO S/S OF DISTRESS.
WIFE STAED HE DID MUCH BETTER TODAY, WILL CONTIUE WITH PLAIN OF CARE

## 2019-01-28 VITALS — SYSTOLIC BLOOD PRESSURE: 127 MMHG | DIASTOLIC BLOOD PRESSURE: 62 MMHG

## 2019-01-28 VITALS — DIASTOLIC BLOOD PRESSURE: 61 MMHG | SYSTOLIC BLOOD PRESSURE: 128 MMHG

## 2019-01-28 VITALS — SYSTOLIC BLOOD PRESSURE: 149 MMHG | DIASTOLIC BLOOD PRESSURE: 89 MMHG

## 2019-01-28 VITALS — SYSTOLIC BLOOD PRESSURE: 167 MMHG | DIASTOLIC BLOOD PRESSURE: 104 MMHG

## 2019-01-28 VITALS — DIASTOLIC BLOOD PRESSURE: 77 MMHG | SYSTOLIC BLOOD PRESSURE: 141 MMHG

## 2019-01-28 VITALS — DIASTOLIC BLOOD PRESSURE: 59 MMHG | SYSTOLIC BLOOD PRESSURE: 135 MMHG

## 2019-01-28 LAB
ALBUMIN SERPL-MCNC: 2.3 G/DL (ref 3.4–5)
ALP SERPL-CCNC: 57 U/L (ref 46–116)
ALT SERPL-CCNC: 28 U/L (ref 10–68)
ANION GAP SERPL CALC-SCNC: 10.2 MMOL/L (ref 8–16)
BASOPHILS NFR BLD AUTO: 0.1 % (ref 0–2)
BILIRUB SERPL-MCNC: 0.54 MG/DL (ref 0.2–1.3)
BUN SERPL-MCNC: 38 MG/DL (ref 7–18)
CALCIUM SERPL-MCNC: 8.8 MG/DL (ref 8.5–10.1)
CHLORIDE SERPL-SCNC: 102 MMOL/L (ref 98–107)
CO2 SERPL-SCNC: 32.4 MMOL/L (ref 21–32)
CREAT SERPL-MCNC: 1 MG/DL (ref 0.6–1.3)
EOSINOPHIL NFR BLD: 0.2 % (ref 0–7)
ERYTHROCYTE [DISTWIDTH] IN BLOOD BY AUTOMATED COUNT: 12.2 % (ref 11.5–14.5)
GLOBULIN SER-MCNC: 2.9 G/L
GLUCOSE SERPL-MCNC: 153 MG/DL (ref 74–106)
HCT VFR BLD CALC: 42.6 % (ref 42–54)
HGB BLD-MCNC: 14.2 G/DL (ref 13.5–17.5)
IMM GRANULOCYTES NFR BLD: 0.3 % (ref 0–5)
LYMPHOCYTES NFR BLD AUTO: 12.9 % (ref 15–50)
MCH RBC QN AUTO: 31.6 PG (ref 26–34)
MCHC RBC AUTO-ENTMCNC: 33.3 G/DL (ref 31–37)
MCV RBC: 94.9 FL (ref 80–100)
MONOCYTES NFR BLD: 5.1 % (ref 2–11)
NEUTROPHILS NFR BLD AUTO: 81.4 % (ref 40–80)
OSMOLALITY SERPL CALC.SUM OF ELEC: 290 MOSM/KG (ref 275–300)
PLATELET # BLD: 207 10X3/UL (ref 130–400)
PMV BLD AUTO: 11.5 FL (ref 7.4–10.4)
POTASSIUM SERPL-SCNC: 4.6 MMOL/L (ref 3.5–5.1)
PROT SERPL-MCNC: 5.2 G/DL (ref 6.4–8.2)
RBC # BLD AUTO: 4.49 10X6/UL (ref 4.2–6.1)
SODIUM SERPL-SCNC: 140 MMOL/L (ref 136–145)
WBC # BLD AUTO: 13.2 10X3/UL (ref 4.8–10.8)

## 2019-01-28 NOTE — NUR
OT NOTE: PT VERY RESISTIVE TO THERAPY TODAY. EVERYDAY HE STATES THAT HE CANT
DO ANYTHING, BUT HE IS USUALLY ENCOURAGED TO PERFORM. TODAY, PT BEGAN YELLING
THAT HE COULDNT GET UP AND TO PLEASE JUST LET HIM SLEEP. AFTER EXTENSIVE
CUING, PT WAS AGREEABLE TO SIT ON EDGE OF BED AND STAND FOR ONLY SECONDS,
WHILE LINENS AND PADS WERE CHANGED. PT REFUSED TO SIT UP IN CHAIR. ASSISTED PT
BACK TO BED.
ALFONSO HANSON, OTR/L

## 2019-01-28 NOTE — MORECARE
CASE MANAGEMENT DISCHARGE SUMMARY
 
 
PATIENT: MARLY RADER                      UNIT: [id number]
ACCOUNT#: [id number]                       ADM DATE: 01/10/19
AGE: 78     : 40  SEX: M            ROOM/BED: D.2230    
AUTHOR: BRENDA MELENDEZ                             PHYSICIAN:                               
 
REFERRING PHYSICIAN: KELLI DELACRUZ MD               
DATE OF SERVICE: 19
Discharge Plan
 
 
Patient Name: MARLY RADER
Facility: [facility]:[place]
Encounter #: [id number]
Medical Record #: [id number]
: 1940
Planned Disposition: [facility] w Plan Readm
Anticipated Discharge Date: 
 
Discharge Date: 
Expected LOS: 
Initial Reviewer: [email or web address]
Initial Review Date: 2019
Generated: 19  10:37 am 
Comments
 
DCP- Discharge Planning
 
Updated by [id number]: Marina Dhaliwal on 19   3:34 pm CT
Patient Name: MARLY RADER                                     
Admission Status: ER   
Accout number: [id number]                              
Admission Date: 01-   
: 1940                                                        
Admission Diagnosis:   
Attending: KELLI DELACRUZ                                                
Current LOS:  1   
  
Anticipated DC Date:    
Planned Disposition: [facility] w Plan Readm   
Primary Insurance: MEDICARE A & B   
  
  
Discharge Planning Comments: CM met with patient and his wife to discuss 
discharge planning. Patient is asleep and the wife answers all questions. 
States he lives in long term care at [facility] and the 
plan is to return there. She states he is unable to stand, so he will need 
ambulance transfer. States he is dependent on the nursing staff for his ADL's.
 
 I called [facility] and the DON has left for the day. I will call again on Monday. 
CM will continue to follow and assist with discharge planning/needs.  
  
  
  
  
  
  
: Marina Dhaliwal
 DCPIA - Discharge Planning Initial Assessment
 
Updated by [id number]: Marina Dhaliwal on 19   4:28 pm
*  Is the patient Alert and Oriented?
No
*  How many steps to enter\exit or inside your home? 0/0 *  PCP Nursing home doctor * 
Pharmacy
Nursing Home
*  Preadmission Environment
[facility] Nursing [place]
*  Facility Name
[facility]
*  ADLs
Total Dependent
*  Equipment
Nebulizer
Other
Oxygen
Walker
*  Other Equipment
Power wheelchair
*  List name and contact numbers for known caregivers / representatives who 
currently or will assist patient after discharge:
Lily Rader - spouse - [phone]
*  Verbal permission to speak to the caregivers and representatives has been 
obtained from the patient.
Yes
*  Community resources currently utilized
None
*  Additional services required to return to the preadmission environment?
No
*  Can the patient safely return to the preadmission environment?
Yes
*  Has this patient been hospitalized within the prior 30 days at any 
hospital?
No
 
External Providers
External Provider: [facility]
 
Next Contact Date: 
Service Request Date: 
Service Type: 
 
Resolution: 
 
Reviewer: 
Comments: 
 
 
 
 
 
 
Last DP export: 19   3:38 p
Patient Name: MARLY RADER
Encounter #: [id number]
Page [id number]
 
 
 
 
 
Electronically Signed by BRENDA MELENDEZ on 19 at 0937
 
 
 
 
 
 
**All edits/amendments must be made on the electronic document**
 
DICTATION DATE: 19     : NANCY  19     
RPT#: [id number]                                DC DATE:        
                                               STATUS: ADM IN  
[facility]
[address] [place] [address]
***END OF REPORT***

## 2019-01-29 VITALS — SYSTOLIC BLOOD PRESSURE: 193 MMHG | DIASTOLIC BLOOD PRESSURE: 106 MMHG

## 2019-01-29 VITALS — SYSTOLIC BLOOD PRESSURE: 154 MMHG | DIASTOLIC BLOOD PRESSURE: 80 MMHG

## 2019-01-29 LAB
ALBUMIN SERPL-MCNC: 2.3 G/DL (ref 3.4–5)
ALP SERPL-CCNC: 57 U/L (ref 46–116)
ALT SERPL-CCNC: 24 U/L (ref 10–68)
ANION GAP SERPL CALC-SCNC: 9.2 MMOL/L (ref 8–16)
BASOPHILS NFR BLD AUTO: 0.2 % (ref 0–2)
BILIRUB SERPL-MCNC: 0.61 MG/DL (ref 0.2–1.3)
BUN SERPL-MCNC: 38 MG/DL (ref 7–18)
CALCIUM SERPL-MCNC: 8.7 MG/DL (ref 8.5–10.1)
CHLORIDE SERPL-SCNC: 102 MMOL/L (ref 98–107)
CO2 SERPL-SCNC: 31.7 MMOL/L (ref 21–32)
CREAT SERPL-MCNC: 0.9 MG/DL (ref 0.6–1.3)
EOSINOPHIL NFR BLD: 1.2 % (ref 0–7)
ERYTHROCYTE [DISTWIDTH] IN BLOOD BY AUTOMATED COUNT: 12.2 % (ref 11.5–14.5)
GLOBULIN SER-MCNC: 2.9 G/L
GLUCOSE SERPL-MCNC: 109 MG/DL (ref 74–106)
HCT VFR BLD CALC: 42.2 % (ref 42–54)
HGB BLD-MCNC: 14.7 G/DL (ref 13.5–17.5)
IMM GRANULOCYTES NFR BLD: 0.3 % (ref 0–5)
LYMPHOCYTES NFR BLD AUTO: 18.4 % (ref 15–50)
MCH RBC QN AUTO: 33.2 PG (ref 26–34)
MCHC RBC AUTO-ENTMCNC: 34.8 G/DL (ref 31–37)
MCV RBC: 95.3 FL (ref 80–100)
MONOCYTES NFR BLD: 6.9 % (ref 2–11)
NEUTROPHILS NFR BLD AUTO: 73 % (ref 40–80)
OSMOLALITY SERPL CALC.SUM OF ELEC: 287 MOSM/KG (ref 275–300)
PLATELET # BLD: 189 10X3/UL (ref 130–400)
PMV BLD AUTO: 12.2 FL (ref 7.4–10.4)
POTASSIUM SERPL-SCNC: 3.9 MMOL/L (ref 3.5–5.1)
PROT SERPL-MCNC: 5.2 G/DL (ref 6.4–8.2)
RBC # BLD AUTO: 4.43 10X6/UL (ref 4.2–6.1)
SODIUM SERPL-SCNC: 139 MMOL/L (ref 136–145)
WBC # BLD AUTO: 13.3 10X3/UL (ref 4.8–10.8)

## 2019-01-29 NOTE — NUR
REC'D IN BED WITH EYES CLOSED EASILY TO AROUSED WHEN NAME IS CALLED. RESP EVEN
AND UNLABORED WITH 02 IN USE VIA N/C. NO C/O NOTED OR VOICED. ASSESSMENT
COMPLETED. WIFE AND CL AT BEDSIDE.

## 2019-01-29 NOTE — NUR
DC BACK TO [facility] HOME AT THIS. AWAKE AND ALERT. RESP EVEN
AND UNLABORED WITH NO DISTRESS. WIFE WAS GIVEN DC INSTRUCTION  AS WELL AS
REPORT WAS CALLED AND GIVEN TO KAELYN CHARGE NURSE. PT WAS IN STABLE CONDITION
UPON DEPARTURE.

## 2019-01-29 NOTE — MORECARE
CASE MANAGEMENT DISCHARGE SUMMARY
 
 
PATIENT: MARLY RADER                      UNIT: [id number]
ACCOUNT#: [id number]                       ADM DATE: 01/10/19
AGE: 78     : 40  SEX: M            ROOM/BED: D.2230    
AUTHOR: BRENDA MELENDEZ                             PHYSICIAN:                               
 
REFERRING PHYSICIAN: KELLI DELACRUZ MD               
DATE OF SERVICE: 19
Discharge Plan
 
 
Patient Name: MARLY RADER
Facility: [facility]:[place]
Encounter #: [id number]
Medical Record #: [id number]
: 1940
Planned Disposition: [facility] w Plan Readm
Anticipated Discharge Date: 
 
Discharge Date: 
Expected LOS: 
Initial Reviewer: [email or web address]
Initial Review Date: 2019
Generated: 19   9:54 am 
Comments
 
DCP- Discharge Planning
 
Updated by [id number]: Marina Dhaliwal on 19   7:50 am CT
Discharging to a Medicare (skilled) bed to [facility] 
this morning. They are picking him up in their van at 0900. Wife is in room 
and in agreement to plan. She states he has been able to sit up in the chair 
and he does ride in the van when needed.  aware.
DCP- Discharge Planning
 
Updated by [id number]: Marina Dhaliwal on 19   3:34 pm CT
Patient Name: MARLY RADER                                     
Admission Status: ER   
Accout number: [id number]                              
Admission Date: 01-   
: 1940                                                        
Admission Diagnosis:   
Attending: KELLI DELACRUZ                                                
Current LOS:  1   
  
Anticipated DC Date:    
Planned Disposition: [facility] w Plan Readm   
Primary Insurance: MEDICARE A & B   
 
  
  
Discharge Planning Comments: CM met with patient and his wife to discuss 
discharge planning. Patient is asleep and the wife answers all questions. 
States he lives in long term care at [facility] and the 
plan is to return there. She states he is unable to stand, so he will need 
ambulance transfer. States he is dependent on the nursing staff for his ADL's.
 I called [facility] and the DON has left for the day. I will call again on Monday. 
CM will continue to follow and assist with discharge planning/needs.  
  
  
  
  
  
  
: Marina Dhaliwal
 DCPIA - Discharge Planning Initial Assessment
 
Updated by [id number]: Marina Dhaliwal on 19   4:28 pm
*  Is the patient Alert and Oriented?
No
*  How many steps to enter\exit or inside your home? 0/0 *  PCP Nursing home doctor * 
Pharmacy
Nursing Home
*  Preadmission Environment
Long Term Nursing Home
*  Facility Name
[facility]
*  ADLs
Total Dependent
*  Equipment
Nebulizer
Other
Oxygen
Walker
*  Other Equipment
Power wheelchair
*  List name and contact numbers for known caregivers / representatives who 
currently or will assist patient after discharge:
Lily Rader - spouse - [phone]
*  Verbal permission to speak to the caregivers and representatives has been 
obtained from the patient.
Yes
*  Community resources currently utilized
None
*  Additional services required to return to the preadmission environment?
No
*  Can the patient safely return to the preadmission environment?
Yes
*  Has this patient been hospitalized within the prior 30 days at any 
hospital?
No
 
 
 
 
 
 
Coverage Notice
 
Reviewer: [email or web address] Samira Dhaliwal
 
Notice Issued Date-Time: 2019   8:35
Notice Type: IM Discharge Notice
 
Notice Delivered To: Family Member
Relationship to Patient: Spouse
Representative Name: Lily Rader
 
Delivery Method: HAND - Hand Delivered
Shelia Days:
Prior Verbal Notification: 
 
Recipient Understood Notice: Yes
Recipient Signature: Yes
Med Rec Note Co-signed by Attending:
 
Coverage Notice Comment:  IMM explained, signed by wife, copy given, original 
placed in MR
 
Last DP export: 19   8:37 a
Patient Name: MARLY RADER
Encounter #: [id number]
Page [id number]
 
 
 
 
 
Electronically Signed by BRENDA MELENDEZ on 19 at 0854
 
 
 
 
 
 
**All edits/amendments must be made on the electronic document**
 
DICTATION DATE: 19     : NANCY  1953     
RPT#: [id number]                                DC DATE:        
                                               STATUS: ADM IN  
[facility]
[address] [place] [address]
***END OF REPORT***

## 2019-01-30 NOTE — MORECARE
CASE MANAGEMENT DISCHARGE SUMMARY
 
 
PATIENT: MARLY RADER                      UNIT: [id number]
ACCOUNT#: [id number]                       ADM DATE: 01/10/19
AGE: 78     : 40  SEX: M            ROOM/BED: D.2230    
AUTHOR: BRENDA MELENDEZ                             PHYSICIAN:                               
 
REFERRING PHYSICIAN: KELLI DELACRUZ MD               
DATE OF SERVICE: 19
Discharge Plan
 
 
Patient Name: MARLY RADER
Facility: [facility]:[place]
Encounter #: [id number]
Medical Record #: [id number]
: 1940
Planned Disposition: [facility] w Plan Readm
Anticipated Discharge Date: 
 
Discharge Date: 2019
Expected LOS: 0
Initial Reviewer: [email or web address]
Initial Review Date: 2019
Generated: 19   8:10 am 
Comments
 
DCP- Discharge Planning
 
Updated by [id number]: Marina Dhaliwal on 19   7:50 am CT
Discharging to a Medicare (skilled) bed to [facility] 
this morning. They are picking him up in their van at 0900. Wife is in room 
and in agreement to plan. She states he has been able to sit up in the chair 
and he does ride in the van when needed.  aware.
DCP- Discharge Planning
 
Updated by [email or web address]: Marina Dhaliwal on 19   3:34 pm CT
Patient Name: MARLY RADER                                     
Admission Status: ER   
Accout number: [id number]                              
Admission Date: 01-   
: 1940                                                        
Admission Diagnosis:   
Attending: KELLI DELACRUZ                                                
Current LOS:  1   
  
Anticipated DC Date:    
Planned Disposition: [facility] w Plan Readm   
Primary Insurance: MEDICARE A & B   
 
  
  
Discharge Planning Comments: CM met with patient and his wife to discuss 
discharge planning. Patient is asleep and the wife answers all questions. 
States he lives in long term care at [facility] and the 
plan is to return there. She states he is unable to stand, so he will need 
ambulance transfer. States he is dependent on the nursing staff for his ADL's.
 I called [facility] and the DON has left for the day. I will call again on Monday. 
CM will continue to follow and assist with discharge planning/needs.  
  
  
  
  
  
  
: Marina Dhaliwal
 DCPIA - Discharge Planning Initial Assessment
 
Updated by [id number]: Marina Dhaliwal on 19   4:28 pm
*  Is the patient Alert and Oriented?
No
*  How many steps to enter\exit or inside your home? 0/0 *  PCP Nursing home doctor * 
Pharmacy
Nursing Home
*  Preadmission Environment
Long Term Nursing Home
*  Facility Name
[facility]
*  ADLs
Total Dependent
*  Equipment
Nebulizer
Other
Oxygen
Walker
*  Other Equipment
Power wheelchair
*  List name and contact numbers for known caregivers / representatives who 
currently or will assist patient after discharge:
Lily Rader - spouse - [phone]
*  Verbal permission to speak to the caregivers and representatives has been 
obtained from the patient.
Yes
*  Community resources currently utilized
None
*  Additional services required to return to the preadmission environment?
No
*  Can the patient safely return to the preadmission environment?
Yes
*  Has this patient been hospitalized within the prior 30 days at any 
hospital?
No
 
 
 
 
 
 
Coverage Notice
 
Reviewer: [email or web address] - Marina Dhaliwal
 
Notice Issued Date-Time: 2019   8:35
Notice Type: IM Discharge Notice
 
Notice Delivered To: Family Member
Relationship to Patient: Spouse
Representative Name: Lily Rader
 
Delivery Method: HAND - Hand Delivered
Shelia Days:
Prior Verbal Notification: 
 
Recipient Understood Notice: Yes
Recipient Signature: Yes
Med Rec Note Co-signed by Attending:
 
Coverage Notice Comment:  IMM explained, signed by wife, copy given, original 
placed in MR
 
Last DP export: 19   7:54 a
Patient Name: MARLY RADER
Encounter #: [id number]
Page [id number]
 
 
 
 
 
Electronically Signed by BRENDA MELENDEZ on 19 at 0710
 
 
 
 
 
 
**All edits/amendments must be made on the electronic document**
 
DICTATION DATE: 19     : NANCY  19     
RPT#: [id number]                                DC DATE:19
                                               STATUS: DIS IN  
[facility]
[address] [place] [address]
***END OF REPORT***

## 2019-02-09 ENCOUNTER — HOSPITAL ENCOUNTER (INPATIENT)
Dept: HOSPITAL 84 - D.ER | Age: 79
Discharge: NURSING, REHAB OR LONG-TERM CARE | DRG: 291 | End: 2019-02-09
Attending: FAMILY MEDICINE | Admitting: FAMILY MEDICINE
Payer: MEDICARE

## 2019-02-09 VITALS — SYSTOLIC BLOOD PRESSURE: 175 MMHG | DIASTOLIC BLOOD PRESSURE: 98 MMHG

## 2019-02-09 VITALS — HEIGHT: 66 IN | WEIGHT: 225.47 LBS | BODY MASS INDEX: 36.24 KG/M2

## 2019-02-09 DIAGNOSIS — I11.0: Primary | ICD-10-CM

## 2019-02-09 DIAGNOSIS — I50.33: ICD-10-CM

## 2019-02-09 DIAGNOSIS — F32.9: ICD-10-CM

## 2019-02-09 DIAGNOSIS — J96.90: ICD-10-CM

## 2019-02-09 DIAGNOSIS — J43.9: ICD-10-CM

## 2019-02-09 DIAGNOSIS — F41.9: ICD-10-CM

## 2019-02-09 DIAGNOSIS — E11.40: ICD-10-CM

## 2019-02-09 DIAGNOSIS — Z66: ICD-10-CM

## 2019-02-09 DIAGNOSIS — E11.65: ICD-10-CM

## 2019-02-09 LAB
ALBUMIN SERPL-MCNC: 2.6 G/DL (ref 3.4–5)
ALP SERPL-CCNC: 58 U/L (ref 46–116)
ALT SERPL-CCNC: 23 U/L (ref 10–68)
ANION GAP SERPL CALC-SCNC: 5.9 MMOL/L (ref 8–16)
APTT BLD: 24.6 SECONDS (ref 22.8–39.4)
BASOPHILS NFR BLD AUTO: 0.1 % (ref 0–2)
BILIRUB SERPL-MCNC: 0.53 MG/DL (ref 0.2–1.3)
BUN SERPL-MCNC: 32 MG/DL (ref 7–18)
CALCIUM SERPL-MCNC: 9 MG/DL (ref 8.5–10.1)
CHLORIDE SERPL-SCNC: 105 MMOL/L (ref 98–107)
CK MB SERPL-MCNC: 1.9 U/L (ref 0–3.6)
CK SERPL-CCNC: 19 UL (ref 21–232)
CO2 SERPL-SCNC: 38.3 MMOL/L (ref 21–32)
CREAT SERPL-MCNC: 1 MG/DL (ref 0.6–1.3)
EOSINOPHIL NFR BLD: 0.9 % (ref 0–7)
ERYTHROCYTE [DISTWIDTH] IN BLOOD BY AUTOMATED COUNT: 12.7 % (ref 11.5–14.5)
GLOBULIN SER-MCNC: 3.2 G/L
GLUCOSE SERPL-MCNC: 103 MG/DL (ref 74–106)
HCT VFR BLD CALC: 42 % (ref 42–54)
HGB BLD-MCNC: 13.6 G/DL (ref 13.5–17.5)
IMM GRANULOCYTES NFR BLD: 0.1 % (ref 0–5)
INR PPP: 1.03 (ref 0.85–1.17)
LYMPHOCYTES NFR BLD AUTO: 21.5 % (ref 15–50)
MCH RBC QN AUTO: 32.3 PG (ref 26–34)
MCHC RBC AUTO-ENTMCNC: 32.4 G/DL (ref 31–37)
MCV RBC: 99.8 FL (ref 80–100)
MONOCYTES NFR BLD: 9.1 % (ref 2–11)
NEUTROPHILS NFR BLD AUTO: 68.3 % (ref 40–80)
NT-PROBNP SERPL-MCNC: 2699 PG/ML (ref 0–450)
OSMOLALITY SERPL CALC.SUM OF ELEC: 295 MOSM/KG (ref 275–300)
PLATELET # BLD: 117 10X3/UL (ref 130–400)
PMV BLD AUTO: 10.8 FL (ref 7.4–10.4)
POTASSIUM SERPL-SCNC: 4.2 MMOL/L (ref 3.5–5.1)
PROT SERPL-MCNC: 5.8 G/DL (ref 6.4–8.2)
PROTHROMBIN TIME: 13 SECONDS (ref 11.6–15)
RBC # BLD AUTO: 4.21 10X6/UL (ref 4.2–6.1)
SODIUM SERPL-SCNC: 145 MMOL/L (ref 136–145)
TROPONIN I SERPL-MCNC: 0.02 NG/ML (ref 0–0.06)
WBC # BLD AUTO: 7.7 10X3/UL (ref 4.8–10.8)

## 2019-02-09 NOTE — MORECARE
CASE MANAGEMENT DISCHARGE SUMMARY
 
 
PATIENT: MARLY RADER                      UNIT: [id number]
ACCOUNT#: [id number]                       ADM DATE: 19
AGE: 78     : 40  SEX: M            ROOM/BED: D.2109    
AUTHOR: BRENDA MELENDEZ                             PHYSICIAN:                               
 
REFERRING PHYSICIAN: RUBEN WATTS MD                
DATE OF SERVICE: 19
Discharge Plan
 
 
[facility]: MARLY RADER
[facility]
Encounter #: [id number]
Medical Record #: [id number]
: 1940
Planned Disposition: 
Anticipated Discharge Date: 
 
Discharge Date: 
Expected LOS: 
Initial Reviewer: [email or web address]
Initial Review Date: 2019
Generated: 19   2:46 pm 
  
 
 
 
 
 
 
 
Patient Name: MARLY RADER
 
Encounter #: [id number]
Page [id number]
 
 
 
 
 
Electronically Signed by BRENDA MELENDEZ on 19 at 1346
 
 
 
 
 
 
**All edits/amendments must be made on the electronic document**
 
DICTATION DATE: 19 1346     : NANCY  19 1346     
RPT#: [id number]                                DC DATE:        
                                               STATUS: ADM IN  
[facility]
[address] [place] [address]
***END OF REPORT***

## 2019-02-09 NOTE — NUR
PER NURSING HOME [facility] THAT WAS SENT BY PATIENT. PT IS A DNR. WILL PASS THIS
ON AT SHIFT CHANGE TO ON COMING NURSE.

## 2019-02-09 NOTE — MORECARE
CASE MANAGEMENT DISCHARGE SUMMARY
 
 
PATIENT: MARLY RADER                      UNIT: [id number]
ACCOUNT#: [id number]                       ADM DATE: 19
AGE: 78     : 40  SEX: M            ROOM/BED: D.2109    
AUTHOR: BRENDA MELENDEZ                             PHYSICIAN:                               
 
REFERRING PHYSICIAN: RUBEN WATTS MD                
DATE OF SERVICE: 19
Discharge Plan
 
 
Patient Name: MARLY RADER
[facility]
Encounter #: [id number]
Medical Record #: [id number]
: 1940
Planned Disposition: 
Anticipated Discharge Date: 
 
Discharge Date: 
Expected LOS: 
Initial Reviewer: [email or web address]
Initial Review Date: 2019
Generated: 19   2:52 pm 
Comments
 
DCP- Discharge Planning
 
Updated by [email or web address]: Jayne Lott on 19  12:49 pm CT
CM met with patient and spouse Lily, regarding request to return to facility 
on palliative care. MAVIS spoke with [place] regarding palliative care and she 
states the facility offers this and is willing to accept patient back to [facility].
 CM attempted to contact NICOLAS Mak @[phone] (s-[id number]), but did not 
get an answer to call. CM explained the intent of Palliative Care vs Cure and 
both patient and wife agree to same. Patient Choice Form for [facility] signed and a copy left for patient. Patient will transport 
via ambulance. Notified patient's nurse of same. Family voices no other needs.
  
Jayne Lott RN, CM
 
  
 
 
 
 
 
 
 
 
 
Last DP export: 19  12:46 pm
Patient Name: MARLY RADER
Encounter #: [id number]
Page [id number]
 
 
 
 
 
Electronically Signed by BRENDA MELENDEZ on 19 at 1353
 
 
 
 
 
 
**All edits/amendments must be made on the electronic document**
 
DICTATION DATE: 19 1352     : NANCY  19 1352     
RPT#: [id number]                                DC DATE:        
                                               STATUS: ADM IN  
[facility]
 [place] [address]
***END OF REPORT***

## 2019-02-09 NOTE — NUR
PT LAYING IN BED WITH EYES CLOSED RESTING COMFORTABLY. PT IS ON A BYPAP
CONTINOUS AT 70%%. BED IN LOW POSITION WITH CALL LIGHT IN USE. WILL CONTINUE
TO MONITOR PT AND FOLLOW PLAN OF CARE.

## 2019-02-09 NOTE — NUR
PT SLEEPING, DID NOT WAKE WHEN I ENTERED. DID NOT FURTHER DISTURB. WIFE AT
BEDSIDE, SLEEPING AS WELL. ALSO DID NOT WAKE. CL IN REACH. SRX2. BIPAP ON AND
IN PLROPPER PLACE.

## 2019-02-09 NOTE — NUR
RECEIVED PT FROM THE ER VIA STRECTHER. PT IS ALERT AND ORIENTATED X 1. PT HAS
A IV 20GA IN THE RT AC. FAMILY AT BESIDE. BED IN LOW POSITION WITH CALL LIGHT
IN REACH. WIIL CONTINUE TO MONITOR PT AND FOLLOW PLAN OF CARE.

## 2019-02-09 NOTE — NUR
EVERYTIME I'VE BEEN IN THE ROOM PT HAS VERBALIZED HOW EXTREMELY UNHAPPY HE IS
TO BE HERE AND HOW HE WANTS TO LEAVE IMMEDIATELY. TOLD DR. LAGOS. PTS NURSING
HOME CALLED THIS A.M AND STATED THAT WHEN HE GETS PARTICULARLY GRUMPY HE WILL
INTENTIONALLY URINATE ON THE STAFF (HE DID THE SAME TO OUR TECH LAST NIGHT).
THIS A.M HE EXHIBITED THIS BEHAVIOR AGAIN.

## 2019-02-12 NOTE — MORECARE
CASE MANAGEMENT DISCHARGE SUMMARY
 
 
PATIENT: MARLY RADER                      UNIT: [id number]
ACCOUNT#: [id number]                       ADM DATE: 19
AGE: 78     : 40  SEX: M            ROOM/BED: D.2109    
AUTHOR: AL,DOC                             PHYSICIAN:                               
 
REFERRING PHYSICIAN: RUBEN WATTS MD                
DATE OF SERVICE: 19
Discharge Plan
 
 
Patient Name: MARLY RADER
Facility: [facility]:[place]
Encounter #: [id number]
Medical Record #: [id number]
: 1940
Planned Disposition: Skilled Nursing Facility
Anticipated Discharge Date: 19
 
Discharge Date: 2019
Expected LOS: 1
Initial Reviewer: [id number]
Initial Review Date: 2019
Generated: 19   8:15 am 
Comments
 
DCP- Discharge Planning
 
Updated by [email or web address]: Jayne Lott on 19  12:49 pm CT
CM met with patient and spouse Lily, regarding request to return to facility 
on palliative care. CM spoke with Florence regarding palliative care and she 
states the facility offers this and is willing to accept patient back to [facility].
 CM attempted to contact NICOLAS Mak @[phone] (s-I-l), but did not 
get an answer to call. CM explained the intent of Palliative Care vs Cure and 
both patient and wife agree to same. Patient Choice Form for [facility]/Rehab signed and a copy left for patient. Patient will transport 
via ambulance. Notified patient's nurse of same. Family voices no other needs.
  
Jayne Lott RN CM
 
  
 
 
 
 
 
 
Coverage Notice
 
 
Reviewer: [email or web address] Samira Lott
 
Notice Issued Date-Time: 2019  10:50
Notice Type: Patient Choice Letter
 
Notice Delivered To: Family Member
Relationship to Patient: Spouse
Representative Name: Lily Rader
 
Delivery Method:  - 
Shelia Days:
Prior Verbal Notification: 
 
Recipient Understood Notice: 
Recipient Signature: 
Med Rec Note Co-signed by Attending:
 
Coverage Notice Comment:  
Reviewer: [email or web address] Samira Lott
 
Notice Issued Date-Time: 2019  11:26
Notice Type: IM Admission Notice
 
Notice Delivered To: Family Member
Relationship to Patient: Spouse
Representative Name: Lily Rader
 
Delivery Method:  - 
Shelia Days:
Prior Verbal Notification: 
 
Recipient Understood Notice: 
Recipient Signature: 
Med Rec Note Co-signed by Attending:
 
Coverage Notice Comment:  
 
Last DP export: 19  12:53 pm
Patient Name: MARLY RADER
 
Encounter #: [id number]
Page [id number]
 
 
 
 
 
Electronically Signed by BRENDA MELENDEZ on 19 at 0715
 
 
 
 
 
 
**All edits/amendments must be made on the electronic document**
 
DICTATION DATE: 19     : NANCY  19     
RPT#: [id number]                                DC DATE:19
                                               STATUS: DIS IN  
[facility]
[address] [place] [address]
***END OF REPORT***

## 2020-04-17 NOTE — NUR
LAYING IN BED, AWAKE AND ALERT, 02 PRESENT VIA NC AT 7LPM, WIFE PRESENT IN
ROOM, IV IN RIGHT AC PATENT
DENIES ANY DISCOMFORTS OR NEEDS, BED LOWERED AND LOCKED, CALL LIGHT
WITHIN REACH. CPOC No

## 2022-01-19 NOTE — MORECARE
Problem: Non-Pressure Injury Wound  Goal: # No deterioration in wound  Outcome: Outcome Met, Continue evaluating goal progress toward completion  Note: Outcome Met, Continue evaluating goal progress toward completion  Pt seen by Dr Monroy in clinic. Wounds continue to make good progress, cast stopped today. Plan: continue daily moisturizer at home and follow up in clinic in 3 weeks to ensure wound is healed. Pt given information and script for orthotic shoes. All questions and concerns answered, patient verbalized understanding.    Writer has taken care of this patient today during their wound care visit with the assistance of MILLICENT Lopez.          CASE MANAGEMENT DISCHARGE SUMMARY
 
 
PATIENT: MARLY RADER                      UNIT: [id number]
ACCOUNT#: [id number]                       ADM DATE: 19
AGE: 78     : 40  SEX: M            ROOM/BED: D.2109    
AUTHOR: AL,DOC                             PHYSICIAN:                               
 
REFERRING PHYSICIAN: RUBEN WATTS MD                
DATE OF SERVICE: 19
Discharge Plan
 
 
Patient Name: MARLY RADER
Facility: [facility]:[place]
Encounter #: [id number]
Medical Record #: [id number]
: 1940
Planned Disposition: Skilled Nursing Facility
Anticipated Discharge Date: 19
 
Discharge Date: 2019
Expected LOS: 1
Initial Reviewer: [id number]
Initial Review Date: 2019
Generated: 19   8:22 am 
Comments
 
DCP- Discharge Planning
 
Updated by [id number]: Jayne Lott on 19  12:49 pm CT
CM met with patient and spouse Lily, regarding request to return to facility 
on palliative care. CM spoke with Florence regarding palliative care and she 
states the facility offers this and is willing to accept patient back to [facility].
 CM attempted to contact NICOLAS Mak @[phone] (s-I-l), but did not 
get an answer to call. CM explained the intent of Palliative Care vs Cure and 
both patient and wife agree to same. Patient Choice Form for [facility]/Rehab signed and a copy left for patient. Patient will transport 
via ambulance. Notified patient's nurse of same. Family voices no other needs.
  
Jayne Lott RN CM
 
  
 
 
 
 
 
 
Coverage Notice
 
 
Reviewer: [email or web address] Samira Lott
 
Notice Issued Date-Time: 2019  10:50
Notice Type: Patient Choice Letter
 
Notice Delivered To: Family Member
Relationship to Patient: Spouse
Representative Name: Lily Rader
 
Delivery Method:  - 
Shelia Days:
Prior Verbal Notification: 
 
Recipient Understood Notice: 
Recipient Signature: 
Med Rec Note Co-signed by Attending:
 
Coverage Notice Comment:  
Reviewer: [id number] Samira Lott
 
Notice Issued Date-Time: 2019  11:26
Notice Type: IM Admission Notice
 
Notice Delivered To: Family Member
Relationship to Patient: Spouse
Representative Name: Lily Rader
 
Delivery Method:  - 
Shelia Days:
Prior Verbal Notification: 
 
Recipient Understood Notice: 
Recipient Signature: 
Med Rec Note Co-signed by Attending:
 
Coverage Notice Comment:  
 
Last DP export: 19  12:53 pm
Patient Name: MARLY RADER
 
Encounter #: [id number]
Page [phone]
 
 
 
 
 
Electronically Signed by BRENDA MELENDEZ on 19 at 0722
 
 
 
 
 
 
**All edits/amendments must be made on the electronic document**
 
DICTATION DATE: 19     : NANCY  19     
RPT#: [id number]                                DC DATE:19
                                               STATUS: DIS IN  
[facility]
[address] [place] [address]
***END OF REPORT***